# Patient Record
Sex: MALE | Race: WHITE | Employment: FULL TIME | URBAN - METROPOLITAN AREA
[De-identification: names, ages, dates, MRNs, and addresses within clinical notes are randomized per-mention and may not be internally consistent; named-entity substitution may affect disease eponyms.]

---

## 2021-03-11 ENCOUNTER — TELEPHONE (OUTPATIENT)
Dept: BEHAVIORAL HEALTH | Facility: CLINIC | Age: 56
End: 2021-03-11

## 2021-03-15 ENCOUNTER — TELEPHONE (OUTPATIENT)
Dept: BEHAVIORAL HEALTH | Facility: CLINIC | Age: 56
End: 2021-03-15

## 2021-03-15 ENCOUNTER — HOSPITAL ENCOUNTER (OUTPATIENT)
Dept: BEHAVIORAL HEALTH | Facility: CLINIC | Age: 56
Discharge: HOME | End: 2021-03-15
Attending: FAMILY MEDICINE | Admitting: FAMILY MEDICINE
Payer: COMMERCIAL

## 2021-03-15 VITALS — HEIGHT: 73 IN | WEIGHT: 210 LBS | BODY MASS INDEX: 27.83 KG/M2

## 2021-03-15 PROCEDURE — H0001 ALCOHOL AND/OR DRUG ASSESS: HCPCS | Mod: 95

## 2021-03-15 RX ORDER — LISINOPRIL 40 MG/1
TABLET ORAL
COMMUNITY
Start: 2020-06-15

## 2021-03-15 RX ORDER — CITALOPRAM HYDROBROMIDE 40 MG/1
40 TABLET ORAL DAILY
COMMUNITY
Start: 2021-02-24 | End: 2024-05-13

## 2021-03-15 RX ORDER — METOPROLOL SUCCINATE 25 MG/1
TABLET, EXTENDED RELEASE ORAL
COMMUNITY
Start: 2020-05-19

## 2021-03-15 RX ORDER — AMLODIPINE BESYLATE 10 MG/1
TABLET ORAL
COMMUNITY
Start: 2021-02-03

## 2021-03-15 RX ORDER — DEXTROAMPHETAMINE SACCHARATE, AMPHETAMINE ASPARTATE, DEXTROAMPHETAMINE SULFATE AND AMPHETAMINE SULFATE 5; 5; 5; 5 MG/1; MG/1; MG/1; MG/1
TABLET ORAL
COMMUNITY
Start: 2021-03-05

## 2021-03-15 ASSESSMENT — ANXIETY QUESTIONNAIRES
1. FEELING NERVOUS, ANXIOUS, OR ON EDGE: SEVERAL DAYS
6. BECOMING EASILY ANNOYED OR IRRITABLE: SEVERAL DAYS
4. TROUBLE RELAXING: MORE THAN HALF THE DAYS
5. BEING SO RESTLESS THAT IT IS HARD TO SIT STILL: MORE THAN HALF THE DAYS
7. FEELING AFRAID AS IF SOMETHING AWFUL MIGHT HAPPEN: SEVERAL DAYS
GAD7 TOTAL SCORE: 10
3. WORRYING TOO MUCH ABOUT DIFFERENT THINGS: SEVERAL DAYS
IF YOU CHECKED OFF ANY PROBLEMS ON THIS QUESTIONNAIRE, HOW DIFFICULT HAVE THESE PROBLEMS MADE IT FOR YOU TO DO YOUR WORK, TAKE CARE OF THINGS AT HOME, OR GET ALONG WITH OTHER PEOPLE: VERY DIFFICULT
2. NOT BEING ABLE TO STOP OR CONTROL WORRYING: MORE THAN HALF THE DAYS

## 2021-03-15 ASSESSMENT — COLUMBIA-SUICIDE SEVERITY RATING SCALE - C-SSRS
2. HAVE YOU ACTUALLY HAD ANY THOUGHTS OF KILLING YOURSELF LIFETIME?: NO
2. HAVE YOU ACTUALLY HAD ANY THOUGHTS OF KILLING YOURSELF?: NO
1. IN THE PAST MONTH, HAVE YOU WISHED YOU WERE DEAD OR WISHED YOU COULD GO TO SLEEP AND NOT WAKE UP?: NO
1. IN THE PAST MONTH, HAVE YOU WISHED YOU WERE DEAD OR WISHED YOU COULD GO TO SLEEP AND NOT WAKE UP?: NO

## 2021-03-15 ASSESSMENT — PATIENT HEALTH QUESTIONNAIRE - PHQ9: SUM OF ALL RESPONSES TO PHQ QUESTIONS 1-9: 12

## 2021-03-15 ASSESSMENT — MIFFLIN-ST. JEOR: SCORE: 1841.43

## 2021-03-15 ASSESSMENT — PAIN SCALES - GENERAL: PAINLEVEL: NO PAIN (0)

## 2021-03-15 NOTE — PROGRESS NOTES
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 relationships positive family connections, safe and stable environment, adherence with prescribed medication, living with other people, financial stability and pets    Risk Plan:  See Recommendations for Safety and Risk Management Plan    Review of Symptoms per patient report:  Substance Use:  daily use, substance use at school, substance related decrease in work performance, work absence due to substance use, family relationship problems due to substance use, driving under the influence and cravings/urges to use     Collateral Contact Summary:   Collateral contacts contributing to this assessment:  The patient's electronic medical records were reviewed at time of assessment.    If court related records were reviewed, summarize here: None    Information from collateral contacts supported/largely agreed with information from the client and associated risk ratings.    Information in this assessment was obtained from the medical record and provided by patient who is a good historian.        The patient will have open access to his substance use disorder assessment medical record.    Diagnostic Criteria:   1.)  Substance is often taken in  larger amounts or over a longer period than was intended.  Met for:  Alcohol, Cannabis and Opiates.  2.)  There is persistent desire or unsuccessful efforts to cut down or control use of the substance.  Met for:  Opiates.  3.)  A great deal of time is spent in activities necessary to obtain the substance, use the substance, or recover from its effects.  Met for:  Opiates.  4.)  Craving, or a strong desire or urge to use the substance.  Met for:  Alcohol, Cannabis and Opiates.  5.)  Recurrent use of the substance resulting in a failure to fulfill major role obligations at work, school, or home.  Met for:  Opiates.  6.)  Continued use of the substance despite having persistent or recurrent social or interpersonal problems caused or exacerbated by the effects of its use.  Met for:  Alcohol and Opiates.  8.)  Recurrent use of the substance in which it is physically hazardous.  Met for:  Cannabis and Opiates.  9.)  Use of the substance is continued despite knowledge of having a persistent or recurrent physical or psychological problem that is likely to have been cause or exacerbated by the substance.  Met for:  Cannabis and Opiates.  10.)  Tolerance:  either a need for markedly increased amounts of the substance to achieve the desired effect or a markedly diminished effect with continued use of the dame amount of the substance.  Met for:  Cannabis and Opiates.  11.)  Withdrawal:  either patient endorses characteristic withdrawal syndrome for the substance or the substance (or closely related substance) is taken to relieve or avoid withdrawal symptoms.  Met for:  Alcohol, Cannabis and Opiates.    As evidenced by self report and criteria, the patient meets the following DSM-5 Diagnoses: (Sustained by DSM-5 Criteria Listed Above)      1.)  Opioid Use Disorder Severe - 304.00 (F11.20)  2.)  Cannabis Use Disorder Severe - 304.30 (F12.20)  3.)  Alcohol Use Disorder Moderate - 303.90 (F10.20)  4.)  Depression NOS, per patient  self-report  5.)  ADHD, per patient self-report    Specify if: In early remission:  After full criteria for alcohol/drug use disorder were previously met, none of the criteria for alcohol/drug use disorder have been met for at least 3 months but for less than 12 months (with the exception that Criterion A4,  Craving or a strong desire or urge to use alcohol/drug  may be met).     In sustained remission:   After full criteria for alcohol use disorder were previously met, non of the criteria for alcohol/drug use disorder have been met at any time during a period of 12 months or longer (with the exception that Criterion A4,  Craving or strong desire or urge to use alcohol/drug  may be met).     Specify if:   This additional specifier is used if the individual is in an environment where access to alcohol is restricted.    Mild: Presence of 2-3 symptoms  Moderate: Presence of 4-5 symptoms  Severe: Presence of 6 or more symptoms    Recommendations:     1. Plan for Safety and Risk Management:     It was recommended the patient call 911 or go to the local Emergency Department should there be any significant change in the above risk factors.            Report to child / adult protection services was NA.    2. STACI Referrals:      Recommendations:      1.)  It was recommended the patient abstain from alcohol and from all other non-prescribed mood altering chemicals.   2.)  Follow all of the recommendations of his healthcare providers.  3.)  Enter the Mixed Primary Evening Outpatient program at [facility] in [place], [place] for primary substance use disorder treatment.  4.)  Follow all of the recommendations of his substance use disorder treatment providers.  5.)  Attend 12-step or other support group meetings on a weekly basis.      The patient reported he is willing to follow the above recommendations.    The patient would like the following family or other support people involved in their treatment:   None at this time.    The patient has a history of opiate use and was give treatment options, including Medication Assisted Treatment, and information on the risks of opioid use disorder including recognizing and responding to opioid overdose.    3.  Mental Health Referrals:     The patient would benefit from continuing to follow all of the recommendations of his mental health providers.    4. Cultural Concerns:    The patient did not identify having any cultural concerns regarding mental health, physical health, or substance use issues.     5. Recommendations for treatment focus:      Alcohol / Substance Use - See #2. STACI Referrals above for details on recommendations.  Depressed Mood - See #3. Mental Health Referrals above for details on recommendations.  Anxiety - See #3. Mental Health Referrals above for details on recommendations.  Attentional Problems - See #3. Mental Health Referrals above for details.    Provider Name/ Credentials:  JESUS Puga  March 15, 2021

## 2021-03-15 NOTE — TELEPHONE ENCOUNTER
This patient's Release of Information forms have been e-mailed to the patient via [employer] on 3/15/2021.  This counselor will await the return of this patient's signed Release of Information forms.

## 2021-03-16 ASSESSMENT — ANXIETY QUESTIONNAIRES: GAD7 TOTAL SCORE: 10

## 2021-03-16 NOTE — TELEPHONE ENCOUNTER
Date: 3/15/2021    Idris Woodson will be calling you to schedule a service initiation date for the Mixed Primary Evening Outpatient program at [facility] in [place], [place].  The best current contact telephone number for this patient is [phone].  The patient is aware you are on a waiting list until sometime in mid 4/2021, but he still wanted to be referred to [facility].  If the patient decides the wait is too long for treatment and he would like a different referral please have him contact me directly at [phone].     SBAR  Name:   Idris Woodson   YOB: 1965 Age:  55 year old Gender:  male   Referral Source: Self   Referral SUJATA: No   Insurance: The [facility] Department is responsible for obtaining ALL authorizations for treatment services at the EOP, DOP and LP levels of care.    Financial Screening: Financial approval from [employer]'s business office is NOT NEEDED.     Precipitating Event: Treatment due to own awareness of need for help and Treatment due to pressure from family members to get help     DOC: Opiates    Additional abused substances: Alcohol and Marijuana     Medical:   Past Medical History:   Diagnosis Date     ADHD      Arthritis      Depressive disorder      Hearing loss      Hypertension       Mental Health: Depression, ADHD, History of trauma and/or abuse issues and Significant grief and loss issues     Prior Detox admissions: No prior IP detoxification admission(s).    Prior CD treatments: No prior CD treatment(s).     Psychosocial history:       The patient reported having 1 adult son age 31 who lives on his own.  The patient reported having 1 daughter age 14 who lives in family home.     Stable housing and no concerns    Minimal support network, Marital or relationship conflict with significant other due to substance abuse, Relationship conflict with family members or friends due to substance abuse, No history of  "legal charges, Currently on a MLOA from work since 2/23/2021 and Stable finances     [place] Suicide Risk Status:  Past month: 0. - Very Low Risk:  Evaluation Counselors:  Document in Epic / BramasolAR to counselor \"Very Low Risk\".      Treatment Counselors:  Reassess upon admission as applicable, assess weekly in progress notes under Dimension 3 and summarize in Discharge / Treatment summary under Dimension 3.    Past 24 hours: 0. - Very Low Risk:  Evaluation Counselors:  Document in Epic / BramasolAR to counselor \"Very Low Risk\".      Treatment Counselors:  Reassess upon admission as applicable, assess weekly in progress notes under Dimension 3 and summarize in Discharge / Treatment summary under Dimension 3.     Additional Info as needed: There was no additional information to provide at this time.       "

## 2021-03-16 NOTE — TELEPHONE ENCOUNTER
This patient's STACI Assessment DAANES information was entered directly into the [place] Department [employer] DAANES website on 3/15/2021.  Assessment ID: [id number]

## 2021-03-16 NOTE — TELEPHONE ENCOUNTER
The patient's signed SUJATA's were e-mailed to [facility] on 3/15/2021 to be scanned into medical records.

## 2021-03-23 NOTE — TELEPHONE ENCOUNTER
----- Message from JESUS Alcaraz sent at 3/23/2021  1:22 PM CDT -----  Regarding: Set up 1:1 and IOP STACI appointments for client at [facility] location  Please set up a face to face 1:1 appointment with me for the above client on Monday, April 19 at 4:30PM at the [facility] location. My provider ID is; [id number].    Patient Name:  See above  Location of programming: [facility] [place]  Start Date: April 19, 2021  Group: ([id number] M, T, W, TH 5:30PM  Provider: JESUS Prasad  Number of visits to be scheduled: 24  Length/Duration of Appointment in minutes: 120 M, W,  T  Visit Type (VIDEO/TELEPHONE/IN-PERSON): In person   Additional notes:    Thanks    JESUS Prasad

## 2021-04-19 ENCOUNTER — HOSPITAL ENCOUNTER (OUTPATIENT)
Dept: BEHAVIORAL HEALTH | Facility: CLINIC | Age: 56
End: 2021-04-19
Attending: FAMILY MEDICINE
Payer: COMMERCIAL

## 2021-04-19 ENCOUNTER — HOSPITAL ENCOUNTER (OUTPATIENT)
Dept: BEHAVIORAL HEALTH | Facility: CLINIC | Age: 56
End: 2021-04-19
Attending: FAMILY MEDICINE | Admitting: FAMILY MEDICINE
Payer: COMMERCIAL

## 2021-04-19 DIAGNOSIS — F19.10 SUBSTANCE ABUSE (H): ICD-10-CM

## 2021-04-19 PROCEDURE — 80307 DRUG TEST PRSMV CHEM ANLYZR: CPT | Performed by: FAMILY MEDICINE

## 2021-04-19 PROCEDURE — 80349 CANNABINOIDS NATURAL: CPT | Performed by: FAMILY MEDICINE

## 2021-04-19 PROCEDURE — 80324 DRUG SCREEN AMPHETAMINES 1/2: CPT | Performed by: FAMILY MEDICINE

## 2021-04-19 PROCEDURE — 80361 OPIATES 1 OR MORE: CPT | Performed by: FAMILY MEDICINE

## 2021-04-19 PROCEDURE — 80346 BENZODIAZEPINES1-12: CPT | Performed by: FAMILY MEDICINE

## 2021-04-19 PROCEDURE — H2035 A/D TX PROGRAM, PER HOUR: HCPCS | Mod: HQ

## 2021-04-19 PROCEDURE — 80320 DRUG SCREEN QUANTALCOHOLS: CPT | Performed by: FAMILY MEDICINE

## 2021-04-19 PROCEDURE — 80359 METHYLENEDIOXYAMPHETAMINES: CPT | Performed by: FAMILY MEDICINE

## 2021-04-19 PROCEDURE — H2035 A/D TX PROGRAM, PER HOUR: HCPCS

## 2021-04-20 ENCOUNTER — HOSPITAL ENCOUNTER (OUTPATIENT)
Dept: BEHAVIORAL HEALTH | Facility: CLINIC | Age: 56
End: 2021-04-20
Attending: FAMILY MEDICINE
Payer: COMMERCIAL

## 2021-04-20 LAB
AMPHETAMINES UR QL SCN: POSITIVE
BARBITURATES UR QL: NEGATIVE
BENZODIAZ UR QL: NEGATIVE
CANNABINOIDS UR QL SCN: POSITIVE
COCAINE UR QL: NEGATIVE
CREAT UR-MCNC: 49 MG/DL
ETHANOL UR QL SCN: NEGATIVE
OPIATES UR QL SCN: NEGATIVE
PCP UR QL SCN: NEGATIVE

## 2021-04-20 PROCEDURE — H2035 A/D TX PROGRAM, PER HOUR: HCPCS | Mod: HQ,95 | Performed by: SOCIAL WORKER

## 2021-04-20 NOTE — ADDENDUM NOTE
[facility] on: 4/20/2021 4:37 PM   Actions taken: Clinical Note Signed

## 2021-04-20 NOTE — GROUP NOTE
"Group Therapy Documentation    PATIENT'S NAME: Idris Woodson  MRN:   [id number]  :   1965  ACCT. NUMBER: [id number]  DATE OF SERVICE: 21  START TIME:  5:30 PM  END TIME:  7:30 PM  FACILITATOR(S): Ricardo Britton LADC  TOPIC: BEH Group Therapy  Number of patients attending the group:  8    Group Length:  2 Hours    Group Therapy Type: Life skill(s)    Summary of Group / Topics Discussed:    Balanced Lifestyle , Coping Skills/Lifestyle Managemet, Leisure Exploration/Use of Leisure Time, Self-Care Activities, and Non 12 step peer lead sober support groups. Recovery Program (recovery resilience) skills utilized in the past week. Actions taken to avoid and/or deal with trigger and craving situations leading to relapse.     Group Attendance:  Attended group session    Patient's response to the group topic/interactions:  confronted peers appropriately and cooperative with task    Patient appeared to be Actively participating and Engaged.        Client specific details:  Client first night in [facility] STACI treatment. Client has no experience with previous treatment episodes or sober support groups. Client did express concern about giving up \"all chemicals\" including THC as he believes his primary problem is with Opioids and alcohol. Dimension 4, 5..  "

## 2021-04-20 NOTE — PROGRESS NOTES
Client:  Idris Woodson  MRN: [id number]    Comprehensive Assessment UPDATE/[facility]/[place] Re-Assess   Comprehensive Assessment Update: 4/19/2021    Comprehensive assessment dated 3/15 was reviewed and updates are as follows: ROBERT Dates   Have changed as follows:     Alcohol: from 3/8 to 4/16  THC: From 2/15 to 4/18    Reason for admission today:  Recommendation for client to start an IOP STACI program.     Dates of last use and substance(s) used:  See above    Patient has a history of opiate use and was give treatment options, including Medication Assisted Treatment, and information on the risks of opiod use disorder including recognizing and responding to opiod overdose.    Safety concerns:  This counselor noted that client should obtain several doses of Narcan from his pharmacist  And train the people he lives with to use it on him in the event he experiences an overdose of opioids in their   presence.       Other:  Client and counselor discussed specifics and expectations regarding this abstinence based treatment   program, including use of prescription amphetamines for ADHD diagnosis. We discussed     Health Screening:  Given patient's past history, a medication, and physical condition, is there a fall risk?  No  Does the patient have any pain? No  Is the patient on a special diet? If yes, please explain: no  Does the patient have any concerns regarding your nutritional status? If yes, please explain: no  Has the patient had any appetite changes in the last 3 months?  No  Has the patient had any weight loss or weight gain in the last 3 months? No  Has the patient have a history of an eating disorder or been over-eating, avoiding meals, or inducing vomiting?  No  Does the patient have any dental concerns? (Problems with teeth, pain, cavities, braces)?  NO  Are immunizations up to date?  Yes  Any recent exposure to TB, Hepatitis, Measles, or Strep?  No  Client's BMI is .  Client informed of BMI?       Dimension Scale Ratings:    Dimension 1: 1 Client can tolerate and cope with withdrawal discomfort. The client displays mild to moderate intoxication or signs and symptoms interfering with daily functioning but does not immediately endanger self or others. Client poses minimal risk of severe withdrawal.    Dimension 2: 2 Client has difficulty tolerating and coping with physical problems or has other biomedical problems that interfere with recovery and treatment. Client neglects or does not seek care for serious biomedical problems.  Past Medical History:   Diagnosis Date     ADHD      Arthritis      Depressive disorder      Hearing loss      Hypertension      Current Outpatient Medications   Medication     amLODIPine (NORVASC) 10 MG tablet     amphetamine-dextroamphetamine (ADDERALL) 20 MG tablet     citalopram (CELEXA) 40 MG tablet     lisinopril (ZESTRIL) 40 MG tablet     metoprolol succinate ER (TOPROL-XL) 25 MG 24 hr tablet     No current facility-administered medications for this encounter.      Dimension 3: 2 Client has difficulty with impulse control and lacks coping skills. Client has thoughts of suicide or harm to others without means; however, the thoughts may interfere with participation in some treatment activities. Client has difficulty functioning in significant life areas. Client has moderate symptoms of emotional, behavioral, or cognitive problems. Client is able to participate in most treatment activities.    Dimension 4: 2 Client displays verbal compliance, but lacks consistent behaviors; has low motivation for change; and is passively involved in treatment.    Dimension 5: 3 Client has poor recognition and understanding of relapse and recidivism issues and displays moderately high vulnerability for further substance use or mental health problems. Client has few coping skills and rarely applies coping skills.    Dimension 6: 3 Client is not engaged in structured, meaningful activity and the  "client's peers, family, significant other, and living environment are unsupportive, or there is significant criminal justice system involvement.    Initial Service Plan (ISP)    Immediate health, safety, and preliminary service needs identified and plan includes the following based on available information from clients, referral sources, and collateral information.    Safety (SI, SIB, suicide attempts, aggressive behaviors):  None  Recommended that patient call 911 or go to the local ED should there be a change in any of these risk factors.     Health:  Client does NOT have health issues that would impede participation in treatment    Transportation: Client will be transported to treatment by self.     Other:  Client and counselor discussed that he will be attending S IOP group in person, he will be doing a UA   In the clinic at least each week and he will commit to abstaining from non-prescribed chemicals while in attendance. This will involved signing an SUJATA for his Adderall prescriber noting he is in an STACI treatment program and he is committing to using the prescription as written.     Client and counselor discussed that he will also set up weekly 1:1 therapist appointments as he has been attending in the past ASAP and will sign SUJATA for that provider as well to allow for exchange of clinical information for coordination of care. Counselor explained the parameters of this abstinence based STACI program and cautioned client to not underestimate the effort it will take to begin and maintain abstinence and avoid relapse to \"his drugs of use\".     Patient does not have any identified barriers to participating in referred services.    Vulnerable Adult Assessment    Does the patient possess a physical or mental infirmity or other physical, mental, or emotional dysfunction?  No. Patient is not a vulnerable adult.    Treatment suggestions for client for the time period until the    initial treatment planning session:  In " "addition to above commitment to set up 1:1 therapy sessions for self, client will  consider (and hopefully commit) to set up appointment with Addiction Medication Specialist MD for Suboxone to address  cravings and reduced effect in the event of relapse to opioids and/or alcohol. Client admitted since \"weaning himself away\" from opioids, his use of alcohol has increased in the last several weeks.     [place] Re-Assessment:     Have you ever wished you were dead or that you could go to sleep and not wake up? Lifetime?  No   Past Month?  No     Have you actually had any thoughts of killing yourself?  Lifetime?  No   Past Month?  No     Have you been thinking about how you might do this? Lifetime?  No   Past Month?  No     Have you had these thoughts and had some intention of acting on them?  Lifetime?  N/A   Past Month?  N/A     Have you started to work out the details of how to kill yourself?  Lifetime?  N/A   Past Month?  N/A     Do you intend to carry out this plan?   N/A     When you have the thoughts how long do they last?   N/A    Are there things - anyone or anything (ie Family, Sabianism, pain of death) that stopped you from wanting to die or acting on thoughts of suicide?   Does not apply        2008  [employer]  Used with permission by Alvina Platt, PhD.      Ricardo Britton [facility]       4/19/2021     7:48 PM    "

## 2021-04-21 ENCOUNTER — BEH TREATMENT PLAN (OUTPATIENT)
Dept: BEHAVIORAL HEALTH | Facility: CLINIC | Age: 56
End: 2021-04-21

## 2021-04-21 ENCOUNTER — HOSPITAL ENCOUNTER (OUTPATIENT)
Dept: BEHAVIORAL HEALTH | Facility: CLINIC | Age: 56
End: 2021-04-21
Attending: FAMILY MEDICINE
Payer: COMMERCIAL

## 2021-04-21 DIAGNOSIS — F19.10 SUBSTANCE ABUSE (H): ICD-10-CM

## 2021-04-21 PROCEDURE — H2035 A/D TX PROGRAM, PER HOUR: HCPCS | Mod: HQ
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
## 2021-04-21 NOTE — TREATMENT PLAN
Patient Safety Plan Template    Name:   Idris Woodson YOB: 1965 Age:  56 year old MR Number:  [id number]   Step 1: Warning signs (Thoughts, images, mood, situation, behavior) that a crisis may be developin.      2.     3.      Step 2: Internal coping strategies - Things I can do to take my mind off of my problems without contacting another person (relaxation technique, physical activity):     1.     2.      3.      Step 3: People and social settings that provide distraction:     1. Name:   Phone:    2. Name:    Phone:   3. Place:    4. Place:      The one thing that is most important to me and worth living for is:      Step 4: People whom I can ask for help:     1. Name:    Phone:     2. Name:    Phone:      3. Name:    Phone:      Step 5: Professionals or agencies I can contact during a crisis:     1. Clinician Name: NA:[id number]   Phone:   Clinician Pager or Emergency Contact #:      2. Clinician Name:  Phone:      Clinician Pager or Emergency Contact #:      3. Local Urgent Care Services:    Urgent Care Services Address:     Urgent Care Services Phone:      4. Suicide Prevention Lifeline Phone: 7-861-516-KYPP (7397)     Step 6: Making the environment safe:     1.      2.      Safety Plan Template [address] and Dalton Ramirez is reprinted with the express permission of the authors.  No portion of the Safety Plan Template may be reproduced without the express, written permission.  You can contact the authors at [email or web address] or [email or web address].

## 2021-04-22 ENCOUNTER — HOSPITAL ENCOUNTER (OUTPATIENT)
Dept: BEHAVIORAL HEALTH | Facility: CLINIC | Age: 56
End: 2021-04-22
Attending: FAMILY MEDICINE
Payer: COMMERCIAL

## 2021-04-22 PROCEDURE — H2035 A/D TX PROGRAM, PER HOUR: HCPCS | Mod: HQ
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
## 2021-04-22 NOTE — ADDENDUM NOTE
[facility] on: 4/22/2021 4:44 PM   Actions taken: Clinical Note Signed

## 2021-04-23 LAB
CANNABINOIDS UR CFM-MCNC: 91 NG/ML
CARBOXYTHC/CREAT UR: 186 NG/MG{CREAT}

## 2021-04-26 ENCOUNTER — HOSPITAL ENCOUNTER (OUTPATIENT)
Dept: BEHAVIORAL HEALTH | Facility: CLINIC | Age: 56
End: 2021-04-26
Attending: FAMILY MEDICINE
Payer: COMMERCIAL

## 2021-04-26 DIAGNOSIS — F19.10 SUBSTANCE ABUSE (H): Primary | ICD-10-CM

## 2021-04-26 PROCEDURE — 80307 DRUG TEST PRSMV CHEM ANLYZR: CPT | Performed by: FAMILY MEDICINE

## 2021-04-26 PROCEDURE — H2035 A/D TX PROGRAM, PER HOUR: HCPCS | Mod: HQ

## 2021-04-26 PROCEDURE — 80320 DRUG SCREEN QUANTALCOHOLS: CPT | Performed by: FAMILY MEDICINE

## 2021-04-26 PROCEDURE — 80349 CANNABINOIDS NATURAL: CPT | Performed by: FAMILY MEDICINE

## 2021-04-26 PROCEDURE — 80324 DRUG SCREEN AMPHETAMINES 1/2: CPT | Performed by: FAMILY MEDICINE

## 2021-04-26 PROCEDURE — 80359 METHYLENEDIOXYAMPHETAMINES: CPT | Performed by: FAMILY MEDICINE

## 2021-04-27 ENCOUNTER — HOSPITAL ENCOUNTER (OUTPATIENT)
Dept: BEHAVIORAL HEALTH | Facility: CLINIC | Age: 56
End: 2021-04-27
Attending: FAMILY MEDICINE
Payer: COMMERCIAL

## 2021-04-27 DIAGNOSIS — F19.10 SUBSTANCE ABUSE (H): Primary | ICD-10-CM

## 2021-04-27 LAB
6MAM SERPL-MCNC: NEGATIVE NG/ML
A-OH ALPRAZ UR CFM-MCNC: NEGATIVE NG/ML
ALPRAZ UR CFM-MCNC: NEGATIVE NG/ML
AMPHET UR CFM-MCNC: 7842 NG/ML
AMPHET UR QL CFM: NORMAL
AMPHETAMINES UR QL SCN: POSITIVE
BARBITURATES UR QL: NEGATIVE
BENZODIAZ UR QL: NEGATIVE
CANNABINOIDS UR QL SCN: POSITIVE
COCAINE UR QL: NEGATIVE
CODEINE UR CFM-MCNC: NEGATIVE NG/ML
CREAT UR-MCNC: 106 MG/DL
ETHANOL UR QL SCN: NEGATIVE
ETHYL GLUCURONIDE UR QL: NEGATIVE
LORAZEPAM UR CFM-MCNC: NEGATIVE NG/ML
MORPHINE UR CFM-MCNC: NEGATIVE NG/ML
NORDIAZEPAM UR CFM-MCNC: NEGATIVE NG/ML
OPIATES UR QL SCN: NEGATIVE
OXAZEPAM UR CFM-MCNC: NEGATIVE NG/ML
PCP UR QL SCN: NEGATIVE
TEMAZEPAM UR CFM-MCNC: NEGATIVE NG/ML

## 2021-04-27 PROCEDURE — H2035 A/D TX PROGRAM, PER HOUR: HCPCS | Mod: HQ,GT | Performed by: SOCIAL WORKER

## 2021-04-27 NOTE — GROUP NOTE
"Group Therapy Documentation    PATIENT'S NAME: Idris Woodson  MRN:   [id number]  :   1965  ACCT. NUMBER: [id number]  DATE OF SERVICE: 21  START TIME:  5:30 PM  END TIME:  7:30 PM  FACILITATOR(S): Ricardo Britton LADC  TOPIC: BEH Group Therapy  Number of patients attending the group:  6    Group Length:  2 Hours    Group Therapy Type: Addiction, Life skill(s), and Skills/Education    Summary of Group / Topics Discussed:    Coping Skills/Lifestyle Managemet, Choices in Recovery, Leisure Exploration/Use of Leisure Time, and Post Acute Withdrawal Syndrome/overview and discussion regarding symptom management and brain healing techniques.       Group Attendance:  Attended group session    Patient's response to the group topic/interactions:  confronted peers appropriately and cooperative with task    Patient appeared to be Engaged.        Client specific details:  Client asked questions about different PAWS effects for different drugs of abuse. I responded that I was not aware of data to that effect (I.e. \"more using dreams\" for amphetamines vs. Alcohol). Lead to group discussion regarding intensity and frequency of   Using dreams in general getting less frequent and less intense over time. Dimension 3, 5.   "

## 2021-04-28 ENCOUNTER — HOSPITAL ENCOUNTER (OUTPATIENT)
Dept: BEHAVIORAL HEALTH | Facility: CLINIC | Age: 56
End: 2021-04-28
Attending: FAMILY MEDICINE
Payer: COMMERCIAL

## 2021-04-28 DIAGNOSIS — F19.10 SUBSTANCE ABUSE (H): Primary | ICD-10-CM

## 2021-04-28 PROCEDURE — H2035 A/D TX PROGRAM, PER HOUR: HCPCS | Mod: HQ

## 2021-04-28 NOTE — GROUP NOTE
Group Therapy Documentation    PATIENT'S NAME: Idris Woodson  MRN:   [id number]  :   1965  ACCT. NUMBER: [id number]  DATE OF SERVICE: 21  START TIME:  5:30 PM  END TIME:  8:30 PM  FACILITATOR(S): Alia Cade LICSW  TOPIC: BEH Group Therapy  Number of patients attending the group:  10  Group Length:  3 Hours    Group Therapy Type: Addiction    Summary of Group / Topics Discussed:    Psychoeducation/Skills Self-Care and Humor in Recovery (IOP)  Learn how powerful self-care is in healing from addiction and building resiliency in mental and physical health.  Learn many aspects of self-care linked to elevating moods, increasing energy reserve and staminal, thinking clearer, focusing better on tasks and improving organizational skills.     Objective(s):    Name 3 reasons self-care is crucial for optimal health and recovery from illness.      Identify 2 mental and physical illnesses directly linked to poor self-care.    Identify 3 ways social connections build resilience and may strengthen our immune systems.    Create a personal plan of action to add to daily schedule of structure and routine.     Structure (modalities, homework, worksheets, etc.):    Self-Care quiz (worksheet)    Education on Self-Care with emphasis on Recovery (PowerPoint)    Why Self-care is a necessity, not a luxury (discussion/white board)    Worksheet to explore and prioritize needs    Action plan to start this week    Expected therapeutic outcome(s):     Be more proactive in their mental and physical health     Start their personal self-care plan this week and note results in journal    Take an inventory of choices and behaviors that hinder their health and have a negative effect on the quality of their lives.      Work to remove the obstacles that keep them from these aspects of self-care.     Therapeutic outcome(s) measured by:   Teachback, creation of personal action plan, and group review and evaluation  Telemedicine  "Visit: The patient's condition can be safely assessed and treated via synchronous audio and visual telemedicine encounter.      Reason for Telemedicine Visit: Services only offered telehealth     Originating Site (Patient Location): Patient's home     Distant Site (Provider Location): Provider Remote Setting     Consent:  The patient/guardian has verbally consented to: the potential risks and benefits of telemedicine (video visit) versus in person care; bill my insurance or make self-payment for services provided; and responsibility for payment of non-covered services.      Mode of Communication:  Video Conference via [email or web address]     As the provider I attest to compliance with applicable laws and regulations related to telemedicine.        Group Attendance:  Attended group session    Patient's response to the group topic/interactions:  discussed personal experience with topic, expressed readiness to alter behaviors and expressed understanding of topic    Patient appeared to be Actively participating.        Client specific details:  Js shared that he is doing well and he's excited that his first week of the program is finished as he was nervous about starting this treatment. We spoke of the courage it takes to join this treatment group and share about one's life. He is having some sleep issues and will try a mindfulness marcos tonight and said his stress is at 6-7 mainly due to being on short-term disability and wanting to stay productive as possible. On Saturday he got a lot accomplished and also made dinner for his family and felt very good about the day. He had mild cravings with regard to cannabis but \"none for the opiates.\" On the worksheet he chose the category of sleep hygiene and this week will take the action step of \"tying to cut down on caffeine and sugar, try the sleep marcos and stop my screen time at lease an hour before bed.\" This topic completes one of the six MH Skills Groups required under D3 of his " Treatment Plan.

## 2021-04-29 ENCOUNTER — HOSPITAL ENCOUNTER (OUTPATIENT)
Dept: BEHAVIORAL HEALTH | Facility: CLINIC | Age: 56
End: 2021-04-29
Attending: FAMILY MEDICINE
Payer: COMMERCIAL

## 2021-04-29 DIAGNOSIS — F19.10 SUBSTANCE ABUSE (H): Primary | ICD-10-CM

## 2021-04-29 LAB
CANNABINOIDS UR CFM-MCNC: 22 NG/ML
CARBOXYTHC/CREAT UR: 21 NG/MG{CREAT}

## 2021-04-29 PROCEDURE — H2035 A/D TX PROGRAM, PER HOUR: HCPCS | Mod: HQ

## 2021-04-29 NOTE — GROUP NOTE
"Group Therapy Documentation    PATIENT'S NAME: Idris Woodson  MRN:   [id number]  :   1965  ACCT. NUMBER: [id number]  DATE OF SERVICE: 21  START TIME:  5:30 PM  END TIME:  7:30 PM  FACILITATOR(S): Ricardo Britton LADC  TOPIC: BEH Group Therapy  Number of patients attending the group:  7    Group Length:  2 Hours    Group Therapy Type: Addiction and \"New Perspectives on Addiction and Recovery\" Brain Chemistry, updates on phenomenon   Of how addiction works in the brain and the genetics of addiction and recovery.     Summary of Group / Topics Discussed:    Personal experiences and feedback regarding symptoms of active addiction, use of techniques to \"heal\" brain from active addiction and understanding of how addiction works in the brain in terms of mood altering chemicals and memory/craving phenomenon.           Group Attendance:  Attended group session    Patient's response to the group topic/interactions:  confronted peers appropriately and cooperative with task    Patient appeared to be Attentive and Engaged.        Client specific details:  Client expressed he wanted to know more about the genetic component of addiction and wasn't sure about genetic component of recovery. Lead to group discussion regarding cutting edge research in \"epigenetics\" in terms of trauma and other physical and emotional \"traits\" passed down through generations. Dimension 3, 4.   "

## 2021-04-30 NOTE — PROGRESS NOTES
[facility] Weekly Treatment Plan Review      ATTENDANCE for the following date span:   -     Date     Group Therapy 2.0 hours 3 hours 2.0 hours 2.0 hours  hours   Individual Therapy        Family Therapy        Other (Specify)            Patient did not have any absences during this time period (list absence dates and reason for absence).        Weekly Treatment Plan Review     Treatment Plan initiated on: 21.    Dimension1: Acute Intoxication/Withdrawal Potential -   Previous Dimension Ratin  Current Dimension Ratin  Date of Last Use Alcohol: from 3/8 to   THC: From 2/15 to   Any reports of withdrawal symptoms - No    Dimension 2: Biomedical Conditions & Complications -   Previous Dimension Ratin  Current Dimension Ratin  Medical Concerns:  See below.   Current Medications & Medication Changes:    Past Medical History:   Diagnosis Date     ADHD      Arthritis      Depressive disorder      Hearing loss      Hypertension      Current Outpatient Medications   Medication     amLODIPine (NORVASC) 10 MG tablet     amphetamine-dextroamphetamine (ADDERALL) 20 MG tablet     citalopram (CELEXA) 40 MG tablet     lisinopril (ZESTRIL) 40 MG tablet     metoprolol succinate ER (TOPROL-XL) 25 MG 24 hr tablet     No current facility-administered medications for this encounter.      Medication Prescriber:  See EMR meds prescribed by Nima Posey MD    [address] [place] [address]    [phone] (Fax)      Taking meds as prescribed? Yes  Medication side effects or concerns: This counselor has attempted to determine therapeutic level of amphetamines  In UA test.   Outside medical appointments this week (list provider and reason for visit):  Would be with [facility].     Dimension 3: Emotional/Behavioral Conditions & Complications -   Previous Dimension Ratin  Current Dimension  Ratin  PHQ9     PHQ-9 SCORE 3/15/2021   PHQ-9 Total Score 12     GAD7     AMNA-7 SCORE 3/15/2021   Total Score 10     Mental health diagnosis: History of trauma issues due his parents getting  when he was 10 years old, due to his brother's death from an alcohol and drug overdose in  and due to be bullied for his hearing loss by peers at school when he was growing up.  The patient reported having significant grief and loss issues regarding the deaths of his grandparents and his brother.   Date of last SIB:  None  Date of  last SI:  None  Date of last HI: None    Behavioral Targets:  Improve self-esteem. and Understand the relationship between addiction and mental health issues. Establish and maintain regular appointments with therapist to deal with above issues Learn how to apply self-care and psychotherapy to build a repertoire of daily habits that counteract PAWS, fatigue, depression and anxiety leading to increased resiliency and well-being.     Current MH Assignments:  List 5 ways your addiction has impacted your mental health.  and put actions into place as directed by therapist to deal with above reported issues. Report weekly on acctions taken. Attend each of the following groups, completing one topic per week, to  complete all 6 by due date, unless excused by primary counselor.  All group work to be completed each session to receive an effective outcome.     Narrative:  Client expressing willingness and effort to reestablish BEH therapy 1:1 appts. With outside   Provider.    Dimension 4: Treatment Acceptance / Resistance -   Previous Dimension Ratin  Current Dimension Ratin  STACI Diagnosis:  Alcohol Use Disorder   303.90 (F10.20) Moderate In a controlled environment  304.30 (F12.20) Cannabis Use Disorder Severe  In a controlled environment  Opioid Use Disorder, Specify if:  In a controlled environment with a severity of:  304.00 (F11.20) Severe  Stage - 4  Commitment to tx  process/Stage of change- Precontemplation  STACI assignments - Take action to build recovery resilience on weekly basis. Present using history, consequences of use and 5 values violated. and move up stages of change for sobriety. Increase internal motivation for sobeity.report weekly on recovery program skills put in place to develop recovery resilience.     Narrative - Client reports ambivalence regarding commitment to abstinence from THC.     Dimension 5: Relapse / Continued Problem Potential -   Previous Dimension Rating:  3  Current Dimension Rating:  3  Relapses this week - YES, List See below for UA lab this week.   Urges to use - YES, List THC and alcohol cravings.   UA results -   Recent Results (from the past 168 hour(s))   Drug abuse screen 8 urine (UR)    Collection Time: 04/26/21  7:00 PM   Result Value Ref Range    Amphetamine Qual Urine Positive (A) NEG^Negative    Barbiturates Qual Urine Negative NEG^Negative    Benzodiazepine Qual Urine Negative NEG^Negative    Cannabinoids Qual Urine Positive (A) NEG^Negative    Cocaine Qual Urine Negative NEG^Negative    Ethanol Qual Urine Negative NEG^Negative    Opiates Qualitative Urine Negative NEG^Negative    PCP Qual Urine Negative NEG^Negative   THC Confirmation Quantitative Urine    Collection Time: 04/26/21  7:00 PM   Result Value Ref Range    THC Metabolite 22 ng/mL    THC/Creatinine Ratio 21 ng/mg[creat]   Creatinine random urine    Collection Time: 04/26/21  7:00 PM   Result Value Ref Range    Creatinine Urine Random 106 mg/dL       Narrative- Client admitted recent use of Alcohol and THC prior to first appt.   This week. Agreement is he will be doing UA's weekly.     Dimension 6: Recovery Environment -   Previous Dimension Rating:  3  Current Dimension Rating:  3  Family Involvement - None  Summarize attendance at family groups and family sessions - None  Family supportive of treatment?  Yes    Community support group attendance - None  Recreational  activities - Outdoor activities with minor daughter.     Narrative - Client first week in [facility] STACI treatment.     Justification for Continued Treatment at this Level of Care:  First week in [facility] STACI treatment.     Discharge Planning:  Target Discharge Date/Timeframe:  End of September   Med Mgmt Provider/Appt:  See EMR   Ind therapy Provider/Appt:  See EMR for future SUJATA for provider   Family therapy Provider/Appt:  None   Other referrals:  [facility]    Has vulnerable adult status change? No    Service Coordination:  None    Supervision:  None    Are Treatment Plan goals/objectives effective? No,  *If no, list changes to treatment plan:    Are the current goals meeting client's needs? No,  *If no, list the changes to treatment plan.    Client Input / Response: Client is expressing ambivalence about abstinence    *Client agrees with any changes to the treatment plan: Yes  *Client received copy of changes: No  *Client is aware of right to access a treatment plan review: Yes

## 2021-04-30 NOTE — ADDENDUM NOTE
Encounter addended by: Ricardo Britton [facility] on: 4/30/2021 2:21 PM   Actions taken: Clinical Note Signed

## 2021-04-30 NOTE — GROUP NOTE
"Group Therapy Documentation    PATIENT'S NAME: Idris Woodson  MRN:   [id number]  :   1965  ACCT. NUMBER: [id number]  DATE OF SERVICE: 21  START TIME:  5:30 PM  END TIME:  7:30 PM  FACILITATOR(S): Ricardo Britton LADC  TOPIC: BEH Group Therapy  Number of patients attending the group:  6    Group Length:  2 Hours    Group Therapy Type: Psychotherapeutic  Continued discussion about brain healing  and how craving and memory (euphoric) work together to reinforce relapse. Group lead  discussion regarding personal stress situations and proposed techniques to handle it   without resorting to addictive chemicals.      Summary of Group / Topics Discussed:    Psychoeducation/Skills Stress Management and Addiction    Objective(s):    Identify 2 ways stress is connected to relapse in addiction    Identify 3 ways stress affects physical and mental health    Identify 3 ways to manage stress    Identify 1 healthy daily routine to commit to starting this week.     Structure (modalities, homework, worksheets, etc.):    Stress Management Handout   o Definition of Stress  o Importance of self-awareness to warning signs of stress  o Stress reduction in recovery  o Skills used to reduce stress and counteract its impact    Demonstration and practice of deep breathing exercise     Demonstration and practice of Mindfulness     Demonstration and practice of Progressive Relaxation exercise     Demonstration of Aromatherapy inhalers    Expected therapeutic outcome(s):     Decreased frequency and intensity of PAWS    Increased stamina and resiliency    Improved ability to function at optimal level     Therapeutic outcome(s) measured by:   Teachback and group review and evaluation          Group Attendance:  Attended group session    Patient's response to the group topic/interactions:  cooperative with task    Patient appeared to be Attentive and Engaged.        Client specific details:  Client shared his reluctance to \"buy " "into\" the concept of using one mood altering chemical to replace another \"more dangerous\" drug as pathway for relapse. This is his first treatment episode as an adult. Lead to group discussion with peers who did that as adults and eventually returned to their primary drug of abuse. Counselor did agree that immediate \"lethality\" for heroin for example is greater than THC, but that is not the issue. Dimension 3, 4, 5.  "

## 2021-05-01 NOTE — ADDENDUM NOTE
Encounter addended by: Ricardo Britton LADC on: 5/1/2021 10:34 AM   Actions taken: Clinical Note Signed

## 2021-05-03 LAB
AMPHET UR CFM-MCNC: NORMAL NG/ML
AMPHET UR QL CFM: NORMAL
ETHYL GLUCURONIDE UR QL: NEGATIVE

## 2021-05-05 ENCOUNTER — HOSPITAL ENCOUNTER (OUTPATIENT)
Dept: BEHAVIORAL HEALTH | Facility: CLINIC | Age: 56
End: 2021-05-05
Attending: FAMILY MEDICINE
Payer: COMMERCIAL

## 2021-05-05 DIAGNOSIS — F19.10 SUBSTANCE ABUSE (H): Primary | ICD-10-CM

## 2021-05-05 PROCEDURE — H2035 A/D TX PROGRAM, PER HOUR: HCPCS | Mod: HQ

## 2021-05-06 ENCOUNTER — HOSPITAL ENCOUNTER (OUTPATIENT)
Dept: BEHAVIORAL HEALTH | Facility: CLINIC | Age: 56
End: 2021-05-06
Attending: FAMILY MEDICINE
Payer: COMMERCIAL

## 2021-05-06 DIAGNOSIS — F19.10 SUBSTANCE ABUSE (H): Primary | ICD-10-CM

## 2021-05-06 PROCEDURE — H2035 A/D TX PROGRAM, PER HOUR: HCPCS | Mod: HQ

## 2021-05-06 NOTE — GROUP NOTE
Group Therapy Documentation    PATIENT'S NAME: Idris Woodson  MRN:   [id number]  :   1965  ACCT. NUMBER: [id number]  DATE OF SERVICE: 21  START TIME:  5:30 PM  END TIME:  7:30 PM  FACILITATOR(S): Ricardo Britton LADC  TOPIC: BEH Group Therapy  Number of patients attending the group:  9    Group Length:  2 Hours    Group Therapy Type: Addiction and Psychoeducation    Summary of Group / Topics Discussed:    Grief & Loss and stages of change for sobriety and connections between stages of grief and stages of change.           Group Attendance:  Attended group session    Patient's response to the group topic/interactions:  discussed personal experience with topic    Patient appeared to be Engaged.        Client specific details:  Client shared his understanding of grief and loss of using THC regularly vs. His motivation for recovery. Agreed with peer that while he still has cravings to use THC, the longer he goes without it, the more his motivation to practice recovery program skills for resilience against relapse. Dimension 4, 5..

## 2021-05-07 NOTE — PROGRESS NOTES
[facility] Weekly Treatment Plan Review      ATTENDANCE for the following date span:  5/3 -     Date Monday 5/3 Tuesday 5/4 Wednesday 5/5 Thursday 5/6 Friday    Group Therapy  hours  hours 2.0 hours 2.0 hours  hours   Individual Therapy        Family Therapy        Other (Specify)            Patient did have any absences during this time period (list absence dates and reason for absence).  5/3 was late in returning from out of town trip, called counselor.  was not feeling well and called counselor.       Weekly Treatment Plan Review     Treatment Plan initiated on: 21.    Dimension1: Acute Intoxication/Withdrawal Potential -   Previous Dimension Ratin  Current Dimension Ratin  Date of Last Use Alcohol: from 3/8 to   THC: From 2/15 to   Any reports of withdrawal symptoms - No    Dimension 2: Biomedical Conditions & Complications -   Previous Dimension Ratin  Current Dimension Ratin  Medical Concerns:  See below.   Current Medications & Medication Changes:    Past Medical History:   Diagnosis Date     ADHD      Arthritis      Depressive disorder      Hearing loss      Hypertension      Current Outpatient Medications   Medication     amLODIPine (NORVASC) 10 MG tablet     amphetamine-dextroamphetamine (ADDERALL) 20 MG tablet     citalopram (CELEXA) 40 MG tablet     lisinopril (ZESTRIL) 40 MG tablet     metoprolol succinate ER (TOPROL-XL) 25 MG 24 hr tablet     No current facility-administered medications for this encounter.      Medication Prescriber:  See EMR meds prescribed by Nima Posey MD    [address] [place] [address]    [phone] (Fax)      Taking meds as prescribed? Yes  Medication side effects or concerns: This counselor has attempted to determine therapeutic level of amphetamines  In UA test.   Outside medical appointments this week (list provider and reason for visit):  [facility]     Dimension 3:  Emotional/Behavioral Conditions & Complications -   Previous Dimension Ratin  Current Dimension Ratin  PHQ9     PHQ-9 SCORE 3/15/2021   PHQ-9 Total Score 12     GAD7     AMNA-7 SCORE 3/15/2021   Total Score 10     Mental health diagnosis: History of trauma issues due his parents getting  when he was 10 years old, due to his brother's death from an alcohol and drug overdose in  and due to be bullied for his hearing loss by peers at school when he was growing up.  The patient reported having significant grief and loss issues regarding the deaths of his grandparents and his brother.   Date of last SIB:  None  Date of  last SI:  None  Date of last HI: None    Behavioral Targets:  Improve self-esteem. and Understand the relationship between addiction and mental health issues. Establish and maintain regular appointments with therapist to deal with above issues Learn how to apply self-care and psychotherapy to build a repertoire of daily habits that counteract PAWS, fatigue, depression and anxiety leading to increased resiliency and well-being.     Current MH Assignments:  List 5 ways your addiction has impacted your mental health.  and put actions into place as directed by therapist to deal with above reported issues. Report weekly on acctions taken. Attend each of the following groups, completing one topic per week, to  complete all 6 by due date, unless excused by primary counselor.  All group work to be completed each session to receive an effective outcome.     Narrative:  Client expressing willingness and effort to reestablish BEH therapy 1:1 appts. With outside   provider.    Dimension 4: Treatment Acceptance / Resistance -   Previous Dimension Ratin  Current Dimension Ratin  STACI Diagnosis:  Alcohol Use Disorder   303.90 (F10.20) Moderate In a controlled environment  304.30 (F12.20) Cannabis Use Disorder Severe  In a controlled environment  Opioid Use Disorder, Specify if:  In a  controlled environment with a severity of:  304.00 (F11.20) Severe  Stage - 4  Commitment to tx process/Stage of change- Precontemplation  STACI assignments - Take action to build recovery resilience on weekly basis. Present using history, consequences of use and 5 values violated. and move up stages of change for sobriety. Increase internal motivation for sobriety. Report weekly on recovery program skills put in place to develop recovery resilience.     Narrative - Client reports ambivalence regarding commitment to abstinence from THC. Seems to be moving away from that ambivalent attitude.     Dimension 5: Relapse / Continued Problem Potential -   Previous Dimension Rating:  3  Current Dimension Rating:  3  Relapses this week - YES, List See below for UA lab this week.   Urges to use - YES, List THC and alcohol cravings.   UA results -   No results found for this or any previous visit (from the past 168 hour(s)).    Narrative- Client admitted recent use of Alcohol and THC prior to first appt.   This week. Agreement is he will be doing UA's weekly.     Dimension 6: Recovery Environment -   Previous Dimension Rating:  3  Current Dimension Rating:  3  Family Involvement - None  Summarize attendance at family groups and family sessions - None  Family supportive of treatment?  Yes    Community support group attendance - None  Recreational activities - Outdoor activities with minor daughter.     Narrative - Client reports rebuilding relationships with wife and minor daughter. Reports more interaction.      Justification for Continued Treatment at this Level of Care:  Early sobriety.     Discharge Planning:  Target Discharge Date/Timeframe:  End of September   Med Mgmt Provider/Appt:  See EMR   Ind therapy Provider/Appt:  See EMR for future SUJATA for provider   Family therapy Provider/Appt:  None   Other referrals:  [facility]    Has vulnerable adult status change? No    Service  Coordination:  None    Supervision:  Consulted regarding missed appts. With psychotherapist peer.     Are Treatment Plan goals/objectives effective? Yes  *If no, list changes to treatment plan:    Are the current goals meeting client's needs? Yes  *If no, list the changes to treatment plan.    Client Input / Response: Client is expressing ambivalence about abstinence    *Client agrees with any changes to the treatment plan: Yes  *Client received copy of changes: No  *Client is aware of right to access a treatment plan review: Yes

## 2021-05-07 NOTE — ADDENDUM NOTE
Encounter addended by: Ricardo Britton [facility] on: 5/6/2021 9:15 PM   Actions taken: Clinical Note Signed

## 2021-05-07 NOTE — GROUP NOTE
"Group Therapy Documentation    PATIENT'S NAME: Idris Woodson  MRN:   [id number]  :   1965  ACCT. NUMBER: [id number]  DATE OF SERVICE: 21  START TIME:  5:30 PM  END TIME:  7:30 PM  FACILITATOR(S): Ricardo Britton LADC  TOPIC: BEH Group Therapy  Number of patients attending the group:  10    Group Length:  2 Hours    Group Therapy Type: Addiction and Life skill(s)    Summary of Group / Topics Discussed:    Boundaries, Coping Skills/Lifestyle Managemet, Choices in Recovery, Thinking Errors/Negative Self-Talk, and video and discussion regarding the maintenance of long term recovery - why it's important to take \"the long view\" after STACI treatment. Also group discussion on replacing addiction to chemicals with other negative behaviors vs. Choice to commit to healthy and healing activities for mind, body and spirit.       Group Attendance:  Attended group session    Patient's response to the group topic/interactions:  cooperative with task and discussed personal experience with topic    Patient appeared to be Attentive and Engaged.        Client specific details:  Client started discussion in group regarding the prevalence of substituting a \"less dangerous drug\" than say opioids or meth for primary drug of use. Or conversely, are there non drug behaviors that are equally unhealthy that take the place of the drugs in early sobriety? This counselor and his peers responded with alternative thought that if we're looking for \"good choices vs. Bad choices\" in sobriety, learn from each other. Dimension 4, 5, 6.   "

## 2021-05-10 ENCOUNTER — HOSPITAL ENCOUNTER (OUTPATIENT)
Dept: BEHAVIORAL HEALTH | Facility: CLINIC | Age: 56
End: 2021-05-10
Attending: FAMILY MEDICINE
Payer: COMMERCIAL

## 2021-05-10 PROCEDURE — H2035 A/D TX PROGRAM, PER HOUR: HCPCS | Mod: HQ,GT

## 2021-05-10 NOTE — GROUP NOTE
Group Therapy Documentation    PATIENT'S NAME: Idris Woodson  MRN:   [id number]  :   1965  ACCT. NUMBER: [id number]  DATE OF SERVICE: 5/10/21  START TIME:  5:30 PM  END TIME:  7:30 PM  FACILITATOR(S): Marcus Crocker LADC  TOPIC: BEH Group Therapy  Number of patients attending the group:  11  Group Length:  2 Hours    Group Therapy Type: Life skill(s)    Summary of Group / Topics Discussed:    Frustration and Distress Tolerance: Clients participated in introductions where client shared followed by a brief meditation on gratitude and a reading from the book 24 Hours. A short discussion followed each where clients had the opportunity to share thoughts. Client read in group a piece on the topic ?eveloping frustration tolerance  and processed how address the oversized / extreme reactions to normal stressors and inconveniences of everyday life caused by drugs and alcohol addiction and, in small groups identified issues in their lives that cause frustration. Clients explored solutions that can be used to   build Frustration Tolerance. Clients learned how it is necessary to change their thinking and work through challenges and problems. This relates to Clts Dim 3 and 5 Tx Plan Goals.      Telemedicine Visit: The patient's condition can be safely assessed and treated via synchronous audio and visual telemedicine encounter.      Reason for Telemedicine Visit: Services only offered telehealth    Originating Site (Patient Location): Patient's home    Distant Site (Provider Location): Provider Remote Setting    Consent:  The patient/guardian has verbally consented to: the potential risks and benefits of telemedicine (video visit) versus in person care; bill my insurance or make self-payment for services provided; and responsibility for payment of non-covered services.     Mode of Communication:  Video Conference via [employer]    As the provider I attest to compliance with applicable laws and regulations related  to telemedicine.        Group Attendance:  Attended group session    Patient's response to the group topic/interactions:  cooperative with task    Patient appeared to be Attentive.        Client specific details:  Clt shared his frustrations when people don't return calls or emails and he has to be able to acknowledge he has no idea what may be going on with them that could be keeping them from responding. They could be in their own personal crisis and unable to check messages right away.

## 2021-05-11 ENCOUNTER — HOSPITAL ENCOUNTER (OUTPATIENT)
Dept: BEHAVIORAL HEALTH | Facility: CLINIC | Age: 56
End: 2021-05-11
Attending: FAMILY MEDICINE
Payer: COMMERCIAL

## 2021-05-11 DIAGNOSIS — F19.10 SUBSTANCE ABUSE (H): Primary | ICD-10-CM

## 2021-05-11 PROCEDURE — H2035 A/D TX PROGRAM, PER HOUR: HCPCS | Mod: HQ,GT | Performed by: SOCIAL WORKER

## 2021-05-12 ENCOUNTER — HOSPITAL ENCOUNTER (OUTPATIENT)
Dept: BEHAVIORAL HEALTH | Facility: CLINIC | Age: 56
End: 2021-05-12
Attending: FAMILY MEDICINE
Payer: COMMERCIAL

## 2021-05-12 DIAGNOSIS — F19.10 SUBSTANCE ABUSE (H): Primary | ICD-10-CM

## 2021-05-12 PROCEDURE — H2035 A/D TX PROGRAM, PER HOUR: HCPCS | Mod: HQ

## 2021-05-12 NOTE — GROUP NOTE
Group Therapy Documentation    PATIENT'S NAME: Idris Woodson  MRN:   [id number]  :   1965  ACCT. NUMBER: [id number]  DATE OF SERVICE: 21  START TIME:  5:30 PM  END TIME:  8:30 PM  FACILITATOR(S): Alia Cade LICSW  TOPIC: BEH Group Therapy  Number of patients attending the group:  9  Group Length:  3 Hours    Group Therapy Type: Addiction    Summary of Group / Topics Discussed:    Psychoeducation/Skills How Neurobiology Affects Behavior (IOP)  This topic will give a general overview of the neurobiology of addiction with the purpose of teaching new brain-based coping strategies to reduce the impact of their cravings, urges and distorted memories.  The clients will learn how the midbrain uses memory and associations to create cravings and how to counteract these.     Objective(s):    Clients will identify 3 ways to calm their overactive midbrain and strengthen their frontal cortex to lessen the impact of cravings and urges for addictive substances.      Clients will identify the underlying mechanisms at work to help them detach from the thoughts and emotions that trigger using.     Describe the reward pathway involved in addiction    Identify 1 skill  to counteract cravings and urges to use substances    Structure (modalities, homework, worksheets, etc.):    History of the study of addiction as a disease (PowerPoint)    Psychoeducation on the areas of the midbrain involved in addiction and how the various parts of the brain communicate with each other (PowerPoint)    Psychoeducation and discussion on how the escalation of addiction manifests in personal behaviors leading to negative consequences     Hands on practice of evidenced based strategies to calm the midbrain     Hands on practice of evidenced based strategies to strengthen the frontal cortex    Psychoeducation of anatomy of cravings, urges and addictive thinking and how to counteract this pattern     Show video clip of  "cross-addictions & process addictions    Expected therapeutic outcome(s):     A reduction in shame and guilt due to understanding how addiction influences behavior.    Reduction of use episodes due to using skills to reduce cravings.    Therapeutic outcome(s) measured by:   Teachback and group review and evaluation form  Telemedicine Visit: The patient's condition can be safely assessed and treated via synchronous audio and visual telemedicine encounter.      Reason for Telemedicine Visit: Services only offered telehealth     Originating Site (Patient Location): Patient's home     Distant Site (Provider Location): Provider Remote Setting     Consent:  The patient/guardian has verbally consented to: the potential risks and benefits of telemedicine (video visit) versus in person care; bill my insurance or make self-payment for services provided; and responsibility for payment of non-covered services.      Mode of Communication:  Video Conference via [email or web address]     As the provider I attest to compliance with applicable laws and regulations related to telemedicine.     Group Attendance:  Attended group session    Patient's response to the group topic/interactions:  discussed personal experience with topic and expressed understanding of topic    Patient appeared to be Actively participating.        Client specific details:  Js stated that his week is going well and his stress is between 2-3 \"just normal home stuff.\" His reported no cravings and is not yet familiar with mindfulness which will be the topic next week. Js's feedback on this topic today included that he found it interesting how the brain \"tends to balance the chemicals in it.\" He said this will help him \"be cognizant of the fact that the brain can trick us to get that feeling back.\" This topic completes one of the six MH Skills Groups required under D3 of his Treatment Plan.        "

## 2021-05-13 ENCOUNTER — HOSPITAL ENCOUNTER (OUTPATIENT)
Dept: BEHAVIORAL HEALTH | Facility: CLINIC | Age: 56
End: 2021-05-13
Attending: FAMILY MEDICINE
Payer: COMMERCIAL

## 2021-05-13 DIAGNOSIS — F19.10 SUBSTANCE ABUSE (H): Primary | ICD-10-CM

## 2021-05-13 PROCEDURE — H2035 A/D TX PROGRAM, PER HOUR: HCPCS | Mod: HQ

## 2021-05-13 ASSESSMENT — ANXIETY QUESTIONNAIRES
2. NOT BEING ABLE TO STOP OR CONTROL WORRYING: SEVERAL DAYS
3. WORRYING TOO MUCH ABOUT DIFFERENT THINGS: SEVERAL DAYS
7. FEELING AFRAID AS IF SOMETHING AWFUL MIGHT HAPPEN: NOT AT ALL
GAD7 TOTAL SCORE: 7
5. BEING SO RESTLESS THAT IT IS HARD TO SIT STILL: SEVERAL DAYS
6. BECOMING EASILY ANNOYED OR IRRITABLE: SEVERAL DAYS
1. FEELING NERVOUS, ANXIOUS, OR ON EDGE: SEVERAL DAYS
IF YOU CHECKED OFF ANY PROBLEMS ON THIS QUESTIONNAIRE, HOW DIFFICULT HAVE THESE PROBLEMS MADE IT FOR YOU TO DO YOUR WORK, TAKE CARE OF THINGS AT HOME, OR GET ALONG WITH OTHER PEOPLE: SOMEWHAT DIFFICULT

## 2021-05-13 ASSESSMENT — PATIENT HEALTH QUESTIONNAIRE - PHQ9
5. POOR APPETITE OR OVEREATING: MORE THAN HALF THE DAYS
SUM OF ALL RESPONSES TO PHQ QUESTIONS 1-9: 9

## 2021-05-13 NOTE — ADDENDUM NOTE
[facility]: 5/13/2021 4:14 PM   Actions taken: Clinical Note Signed

## 2021-05-13 NOTE — ADDENDUM NOTE
[facility]: 5/13/2021 2:16 PM   Actions taken: Flowsheet accepted

## 2021-05-13 NOTE — PROGRESS NOTES
[facility] Weekly Treatment Plan Review      ATTENDANCE for the following date span:  5/10 -     Date Monday 5/10 Tuesday 5/11 Wednesday 5/12 Thursday 5/13 Friday    Group Therapy  hours  hours 2.0 hours 2.0 hours  hours   Individual Therapy 2 3      Family Therapy        Other (Specify)            Patient did not have any absences during this time period (list absence dates and reason for absence).        Weekly Treatment Plan Review     Treatment Plan initiated on: 21.    Dimension1: Acute Intoxication/Withdrawal Potential -   Previous Dimension Ratin  Current Dimension Ratin  Date of Last Use Alcohol: from 3/8 to   THC: From 2/15 to   Any reports of withdrawal symptoms - No    Dimension 2: Biomedical Conditions & Complications -   Previous Dimension Ratin  Current Dimension Ratin  Medical Concerns:  See below.   Current Medications & Medication Changes:    Past Medical History:   Diagnosis Date     ADHD      Arthritis      Depressive disorder      Hearing loss      Hypertension      Current Outpatient Medications   Medication     amLODIPine (NORVASC) 10 MG tablet     amphetamine-dextroamphetamine (ADDERALL) 20 MG tablet     citalopram (CELEXA) 40 MG tablet     lisinopril (ZESTRIL) 40 MG tablet     metoprolol succinate ER (TOPROL-XL) 25 MG 24 hr tablet     No current facility-administered medications for this encounter.      Medication Prescriber:  See EMR meds prescribed by Nima Posey MD    [address] [place] [address]    [phone] (Fax)      Taking meds as prescribed? Yes  Medication side effects or concerns: This counselor has attempted to determine therapeutic level of amphetamines In UA test.  Outside medical appointments this week (list provider and reason for visit):  Would be with [facility].     Dimension 3: Emotional/Behavioral Conditions & Complications -   Previous Dimension Ratin  Current Dimension  Ratin  PHQ9     PHQ-9 SCORE 3/15/2021 2021   PHQ-9 Total Score 12 9     GAD7     AMNA-7 SCORE 3/15/2021 2021   Total Score 10 7     Mental health diagnosis: History of trauma issues due his parents getting  when he was 10 years old, due to his brother's death from an alcohol and drug overdose in  and due to be bullied for his hearing loss by peers at school when he was growing up. The patient reported having significant grief and loss issues regarding the deaths of his grandparents and his brother.   Date of last SIB:  None  Date of  last SI:  None  Date of last HI: None    Behavioral Targets:  Improve self-esteem. and Understand the relationship between addiction and mental health issues. Establish and maintain regular appointments with therapist to deal with above issues Learn how to apply self-care and psychotherapy to build a repertoire of daily habits that counteract PAWS, fatigue, depression and anxiety leading to increased resiliency and well-being.     Current MH Assignments:  List 5 ways your addiction has impacted your mental health.  and put actions into place as directed by therapist to deal with above reported issues. Report weekly on acctions taken. Attend each of the following groups, completing one topic per week, to  complete all 6 by due date, unless excused by primary counselor.  All group work to be completed each session to receive an effective outcome.     Narrative:  Client expressing willingness and effort to reestablish BEH therapy 1:1 appts. with outside   provider.    Dimension 4: Treatment Acceptance / Resistance -   Previous Dimension Ratin  Current Dimension Ratin  STACI Diagnosis:  Alcohol Use Disorder   303.90 (F10.20) Moderate In a controlled environment  304.30 (F12.20) Cannabis Use Disorder Severe  In a controlled environment  Opioid Use Disorder, Specify if:  In a controlled environment with a severity of:  304.00 (F11.20) Severe  Stage -  4  Commitment to tx process/Stage of change- Precontemplation  STACI assignments - Take action to build recovery resilience on weekly basis. Present using history, consequences of use and 5 values violated. and move up stages of change for sobriety. Increase internal motivation for sobriety. Report weekly on recovery program skills put in place to develop recovery resilience.     Narrative - Client reports ambivalence regarding commitment to abstinence from THC. Seems to be moving away from that ambivalent attitude.     Dimension 5: Relapse / Continued Problem Potential -   Previous Dimension Rating:  3  Current Dimension Rating:  3  Relapses this week - YES, List See below for UA lab this week.   Urges to use - YES, List THC and alcohol cravings.   UA results -   No results found for this or any previous visit (from the past 168 hour(s)).    Narrative- Client admitted recent use of Alcohol and THC prior to first appt.   In IOP STACI. Agreement is he will be doing UA's weekly.     Dimension 6: Recovery Environment -   Previous Dimension Rating:  3  Current Dimension Ratin  Family Involvement - None  Summarize attendance at family groups and family sessions - None  Family supportive of treatment?  Yes    Community support group attendance - None  Recreational activities - Outdoor activities with minor daughter.     Narrative - Client reports rebuilding relationships with wife and minor daughter. Reports more interaction.      Justification for Continued Treatment at this Level of Care:  Early sobriety.     Discharge Planning:  Target Discharge Date/Timeframe:     Med Mgmt Provider/Appt:  See EMR   Ind therapy Provider/Appt:  See EMR for future SUJATA for provider   Family therapy Provider/Appt:  None   Other referrals:  [facility]    Has vulnerable adult status change? No    Service Coordination:  None    Supervision:  Consulted regarding missed appts. With  psychotherapist peer.     Are Treatment Plan goals/objectives effective? Yes  *If no, list changes to treatment plan:    Are the current goals meeting client's needs? Yes  *If no, list the changes to treatment plan.    Client Input / Response: Client is expressing ambivalence about abstinence    *Client agrees with any changes to the treatment plan: Yes  *Client received copy of changes: No  *Client is aware of right to access a treatment plan review: Yes

## 2021-05-13 NOTE — GROUP NOTE
"Group Therapy Documentation    PATIENT'S NAME: Idris Woodson  MRN:   [id number]  :   1965  ACCT. NUMBER: [id number]  DATE OF SERVICE: 21  START TIME:  5:30 PM  END TIME:  7:30 PM  FACILITATOR(S): Ricardo Britton LADC  TOPIC: BEH Group Therapy  Number of patients attending the group:  7    Group Length:  2 Hours    Group Therapy Type: Addiction    Summary of Group / Topics Discussed:    Co-occurring Illness, Choices in Recovery, Symptom Management, and PHQ and AMNA questionaire/updates, Breathalyzer test for group,   \"Wasted\" Documentary and discussion regarding using multiple \"tools in the toolbelt\" for recovery vs. Focusing on only one or two and   Assuming best outcomes.         Group Attendance:  Attended group session    Patient's response to the group topic/interactions:  confronted peers appropriately and cooperative with task    Patient appeared to be Attentive and Engaged.        Client specific details:  Client answered counselor question about ethics and intent of breathalyzer tests in STACI treatment. Client expressed understanding of validating hard work being done in STACI treatment vs. Trying to \"catch\" somebody randomly. Dimension 3, 4, 5  "

## 2021-05-14 ASSESSMENT — ANXIETY QUESTIONNAIRES: GAD7 TOTAL SCORE: 7

## 2021-05-14 NOTE — GROUP NOTE
"Group Therapy Documentation    PATIENT'S NAME: Idris Woodson  MRN:   [id number]  :   1965  ACCT. NUMBER: [id number]  DATE OF SERVICE: 21  START TIME:  5:30 PM  END TIME:  7:30 PM  FACILITATOR(S): Ricardo Britton LADC  TOPIC: BEH Group Therapy  Number of patients attending the group:  7    Group Length:  2 Hours    Group Therapy Type: Life skill(s)    Summary of Group / Topics Discussed:    Coping Skills/Lifestyle Managemet, Choices in Recovery, and Distress Tolerance Group discussion regarding   Stigmatizing and trigger producing portrayals of addiction and addicts in the media. Cultural impact and glorification  Of substance use on people in addiction as well as influence on people without STACI and formation of their opinions.       Group Attendance:  Attended group session    Patient's response to the group topic/interactions:  cooperative with task and discussed personal experience with topic    Patient appeared to be Engaged.        Client specific details:  Client shared his past personal experience with using LSD and associated \"trips\". Answered questions from peers regarding \"good trip vs. Bad trip\" and what he remembers about each kind. Client expressed understanding and personal experience with mixing amphetamines with LSD and cumulative danger associated with that behavior. Dimension 2, 3, 5.  "

## 2021-05-17 ENCOUNTER — HOSPITAL ENCOUNTER (OUTPATIENT)
Dept: BEHAVIORAL HEALTH | Facility: CLINIC | Age: 56
End: 2021-05-17
Attending: FAMILY MEDICINE
Payer: COMMERCIAL

## 2021-05-17 DIAGNOSIS — F19.10 SUBSTANCE ABUSE (H): Primary | ICD-10-CM

## 2021-05-17 PROCEDURE — 80320 DRUG SCREEN QUANTALCOHOLS: CPT | Performed by: FAMILY MEDICINE

## 2021-05-17 PROCEDURE — 80359 METHYLENEDIOXYAMPHETAMINES: CPT | Performed by: FAMILY MEDICINE

## 2021-05-17 PROCEDURE — 80307 DRUG TEST PRSMV CHEM ANLYZR: CPT | Performed by: FAMILY MEDICINE

## 2021-05-17 PROCEDURE — 80324 DRUG SCREEN AMPHETAMINES 1/2: CPT | Performed by: FAMILY MEDICINE

## 2021-05-17 PROCEDURE — H2035 A/D TX PROGRAM, PER HOUR: HCPCS | Mod: HQ

## 2021-05-17 PROCEDURE — 80349 CANNABINOIDS NATURAL: CPT | Performed by: FAMILY MEDICINE

## 2021-05-18 ENCOUNTER — HOSPITAL ENCOUNTER (OUTPATIENT)
Dept: BEHAVIORAL HEALTH | Facility: CLINIC | Age: 56
End: 2021-05-18
Attending: FAMILY MEDICINE
Payer: COMMERCIAL

## 2021-05-18 DIAGNOSIS — F19.10 SUBSTANCE ABUSE (H): Primary | ICD-10-CM

## 2021-05-18 LAB
AMPHETAMINES UR QL SCN: POSITIVE
BARBITURATES UR QL: NEGATIVE
BENZODIAZ UR QL: NEGATIVE
CANNABINOIDS UR QL SCN: POSITIVE
COCAINE UR QL: NEGATIVE
CREAT UR-MCNC: 137 MG/DL
ETHANOL UR QL SCN: NEGATIVE
OPIATES UR QL SCN: POSITIVE
PCP UR QL SCN: NEGATIVE

## 2021-05-18 PROCEDURE — H2035 A/D TX PROGRAM, PER HOUR: HCPCS | Mod: HQ,GT | Performed by: SOCIAL WORKER

## 2021-05-18 NOTE — GROUP NOTE
Group Therapy Documentation    PATIENT'S NAME: Idris Woodson  MRN:   [id number]  :   1965  ACCT. NUMBER: [id number]  DATE OF SERVICE: 21  START TIME:  5:30 PM  END TIME:  7:30 PM  FACILITATOR(S): Ricardo Britton LADC  TOPIC: BEH Group Therapy  Number of patients attending the group:  6    Group Length:  2 Hours    Group Therapy Type: Life skill(s) and Values in active addiction and values in recovery discussion.     Summary of Group / Topics Discussed:    Choices in Recovery, Self-Esteem/Self [place], and Thinking Errors/Negative Self-Talk      Group Attendance:  Attended group session    Patient's response to the group topic/interactions:  confronted peers appropriately    Patient appeared to be Attentive and Engaged.        Client specific details:  Client and counselor met to discuss his use of THC and alcohol since starting group . Client has been avoiding UA tests as required by this counselor, and admitted nightly use of THC. Client and counselor spoke 1:1 about options to use of alcohol and THC as sedative to get to sleep and agreed on regular tests going forward to monitor abstinence from all mood altering chemicals. Dimension 1, 5.

## 2021-05-19 ENCOUNTER — HOSPITAL ENCOUNTER (OUTPATIENT)
Dept: BEHAVIORAL HEALTH | Facility: CLINIC | Age: 56
End: 2021-05-19
Attending: FAMILY MEDICINE
Payer: COMMERCIAL

## 2021-05-19 DIAGNOSIS — F19.10 SUBSTANCE ABUSE (H): Primary | ICD-10-CM

## 2021-05-19 PROCEDURE — H2035 A/D TX PROGRAM, PER HOUR: HCPCS | Mod: HQ

## 2021-05-19 NOTE — GROUP NOTE
Group Therapy Documentation    PATIENT'S NAME: Idris Woodson  MRN:   [id number]  :   1965  ACCT. NUMBER: [id number]  DATE OF SERVICE: 21  START TIME:  5:30 PM  END TIME:  8:30 PM  FACILITATOR(S): Alia Cade LICSW  TOPIC: BEH Group Therapy  Number of patients attending the group:  7  Group Length:  3 Hours    Group Therapy Type: Addiction    Summary of Group / Topics Discussed:    Psychoeducation/Skills Mindfulness in Action (IOP)  Learn what mindfulness is and how to practice it. Learn how to increase awareness of the present moment, thereby reducing ruminating thoughts and learn how to embrace negative emotions instead of suppressing or avoiding them. Consistent practice has been shown to decrease reactivity and help facilitate effective action to make positive changes in behavior.     Objective(s):    Identify 2 skills to increase awareness of the present moment to reduce negative impact of ruminating thoughts    Describe how to embrace negative emotions instead of suppressing or avoiding them.    Give 1 example of how mindfulness works in the brain to calm the Survival part of the Midbrain (which is overactive in addiction)    Give1 example of how mindfulness practice may decrease reactivity and facilitate effective action    Structure (modalities, homework, worksheets, etc.):    Complete  before and after  worksheet for mindfulness practice ([place] Exercise)    Participate in Awareness of Sounds meditation    Participate in Mindful Eating meditation    Complete Mindful Listening exercise with other group member(s) (Dyads or Triads)    Participate in Sitting Meditation     Expected therapeutic outcome(s):     Increased ability to remain in present moment    Increased ability to tolerate negative emotions without returning to addiction    Therapeutic outcome(s) measured by:   Teachback and group review and evaluation form.  Telemedicine Visit: The patient's condition can be safely  "assessed and treated via synchronous audio and visual telemedicine encounter.      Reason for Telemedicine Visit: Services only offered telehealth     Originating Site (Patient Location): Patient's home     Distant Site (Provider Location): Provider Remote Setting     Consent:  The patient/guardian has verbally consented to: the potential risks and benefits of telemedicine (video visit) versus in person care; bill my insurance or make self-payment for services provided; and responsibility for payment of non-covered services.      Mode of Communication:  Video Conference via [employer]     As the provider I attest to compliance with applicable laws and regulations related to telemedicine.      Group Attendance:  Attended group session    Patient's response to the group topic/interactions:  discussed personal experience with topic, expressed readiness to alter behaviors and expressed understanding of topic    Patient appeared to be Attentive and Engaged.        Client specific details:  Js shared that he used cannabis to help him sleep and for pain issues he has. He understands that this is an abstinence program and feels bad about this situation. He will be going to his MD to see if he can get back into a sleep study. He also has restless leg syndrome and the cannabis helps with this as well. We spoke of cross addiction and how another substance can \"light up that network\" again and he said he understood this. His stress is 4-5 mainly about using the cannabis which he says he does not have cravings for, nor for alcohol as the opiates were the only issue and he has not had any cravings for the opiates either. He liked the mindful listening and said \"It funneled into a political conversation and we have similar beliefs.\" His [place] exercise was about going back to work and how difficult it has been for him with people wearing masks as he is hard of hearing uses lip-reading to help him communicate. His page 2 was " more optimistic. This topic completes one of the six MH Skills Groups required under D3 of his Treatment Plan.

## 2021-05-19 NOTE — LETTER
Leora Warner  [facility]    5/20/2021    Idris Woodson  1965      I am writing to ask for your assistance in care coordination for the above mutual client. I have attached a signed SUJATA from Idris in this fax. Idris has been attending the [facility] adult evening program in [place] since his admission April 19.     I am currently attempting to work with his Primary Care Physician regarding an ongoing prescription he has open for amphetamine-dextroamphetamine (ADDERALL) 20 MG tablet and if the PCP feels Idris is using those meds as prescribed or not. I would greatly appreciate any documentation you could share with me regarding a Diagnostic Assessment and regular Progress Notes. My fax number is; [phone].     I am asking for your feedback because in addition to testing positive recently for amphetamines (prescribed), you can see he is testing positive for THC and Opiates, which are not prescribed. He is also admitting to using alcohol periodically. 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 therapeutic purposes.     Thanks very much for your time. I look forward to your feedback. I can be reached at [phone].             JESUS Simmons

## 2021-05-20 ENCOUNTER — HOSPITAL ENCOUNTER (OUTPATIENT)
Dept: BEHAVIORAL HEALTH | Facility: CLINIC | Age: 56
End: 2021-05-20
Attending: FAMILY MEDICINE
Payer: COMMERCIAL

## 2021-05-20 DIAGNOSIS — F19.10 SUBSTANCE ABUSE (H): Primary | ICD-10-CM

## 2021-05-20 PROCEDURE — H2035 A/D TX PROGRAM, PER HOUR: HCPCS | Mod: HQ

## 2021-05-20 NOTE — GROUP NOTE
Group Therapy Documentation    PATIENT'S NAME: Idris Woodson  MRN:   [id number]  :   1965  ACCT. NUMBER: [id number]  DATE OF SERVICE: 21  START TIME:  5:00 PM  END TIME:  7:30 PM  FACILITATOR(S): Ricardo Britton LADC  TOPIC: BEH Group Therapy  Number of patients attending the group:  7    Group Length:  2 Hours    Group Therapy Type: Addiction and Life skill(s)    Summary of Group / Topics Discussed:    Psychoeducation/Skills Values, Connection and Committee Action (IOP)  Clients learn key concepts of traditional CBT and build on these by learning three core concepts of third wave therapies (Values, Committed Action, and Self-as-Context) and how to apply these in recovery.     Objective(s):    Identify 2 reasons it is important to re-identify and prioritize values that guide behaviors.    Identify 2 ways that the escalation of  addiction may change previous values-based behaviors in negative ways    Identify 3 positive impacts of making connections socially, environmentally, and within the community     Structure (modalities, homework, worksheets, etc.):    Group activity-mapping psychological flexibility     Excerpts from documentary on importance of connections    Group activity-brainstorming universal and personal values     Values worksheet with Value, Goal, Step to take this week    Expected therapeutic outcome(s):     Reinstatement of values that may have been overlooked in active addiction    Reduction of guilt and remorse     Increased satisfaction in relationships and connections    Improved mood and sense of belonging     Therapeutic outcome(s) measured by:   Teachback and Group Review       Group Attendance:  Attended group session    Patient's response to the group topic/interactions:  cooperative with task and discussed personal experience with topic    Patient appeared to be Attentive and Engaged.        Client specific details:  Client shared he values being a dependable person, but  "right now for example is on JAVIER from his job because of his inability to perform job duties satisfactorily. Client shared during check in that he has been using THC and alcohol \"under the radar\" since starting group four weeks ago. See EMR for details. Dimension 1, 3, 4..  "

## 2021-05-21 LAB
CANNABINOIDS UR CFM-MCNC: 172 NG/ML
CARBOXYTHC/CREAT UR: 126 NG/MG{CREAT}

## 2021-05-21 NOTE — ADDENDUM NOTE
Encounter addended by: Ricardo Britton [facility] on: 5/21/2021 1:24 PM   Actions taken: Clinical Note Signed

## 2021-05-21 NOTE — PROGRESS NOTES
[facility] Weekly Treatment Plan Review      ATTENDANCE for the following date span:   -     Date     Group Therapy 2 hours  3 hours 2.0 hours 2.0 hours  hours   Individual Therapy        Family Therapy        Other (Specify)            Patient did not have any absences during this time period (list absence dates and reason for absence).        Weekly Treatment Plan Review     Treatment Plan initiated on: 21.    Dimension1: Acute Intoxication/Withdrawal Potential -   Previous Dimension Ratin  Current Dimension Ratin  Date of Last Use Alcohol: from 3/8 to   THC: From 2/15 to  Opioids (pills) likely .   Any reports of withdrawal symptoms - No    Dimension 2: Biomedical Conditions & Complications -   Previous Dimension Ratin  Current Dimension Ratin  Medical Concerns:  See below.   Current Medications & Medication Changes:    Past Medical History:   Diagnosis Date     ADHD      Arthritis      Depressive disorder      Hearing loss      Hypertension      Current Outpatient Medications   Medication     amLODIPine (NORVASC) 10 MG tablet     amphetamine-dextroamphetamine (ADDERALL) 20 MG tablet     citalopram (CELEXA) 40 MG tablet     lisinopril (ZESTRIL) 40 MG tablet     metoprolol succinate ER (TOPROL-XL) 25 MG 24 hr tablet     No current facility-administered medications for this encounter.      Medication Prescriber:  See EMR meds prescribed by Nima Posey MD    [address] [facility] [place] [address]    [phone] (Fax)      Taking meds as prescribed? Yes  VM  from Dr. Posey indicates he believes client is taking Adderall as prescribed and has not gotten early renewals.     Medication side effects or concerns: This counselor has attempted to determine therapeutic level of amphetamines In UA test.    Outside medical appointments this week (list provider and reason for  visit):  Would be with [facility].     Dimension 3: Emotional/Behavioral Conditions & Complications -   Previous Dimension Ratin  Current Dimension Ratin  PHQ9     PHQ-9 SCORE 3/15/2021 2021   PHQ-9 Total Score 12 9     GAD7     AMNA-7 SCORE 3/15/2021 2021   Total Score 10 7     Mental health diagnosis: History of trauma issues due his parents getting  when he was 10 years old, due to his brother's death from an alcohol and drug overdose in  and due to be bullied for his hearing loss by peers at school when he was growing up. The patient reported having significant grief and loss issues regarding the deaths of his grandparents and his brother.   Date of last SIB:  None  Date of  last SI:  None  Date of last HI: None    Behavioral Targets:  Improve self-esteem. and Understand the relationship between addiction and mental health issues. Establish and maintain regular appointments with therapist to deal with above issues Learn how to apply self-care and psychotherapy to build a repertoire of daily habits that counteract PAWS, fatigue, depression and anxiety leading to increased resiliency and well-being.     Current MH Assignments:  List 5 ways your addiction has impacted your mental health.  and put actions into place as directed by therapist to deal with above reported issues. Report weekly on acctions taken. Attend each of the following groups, completing one topic per week, to  complete all 6 by due date, unless excused by primary counselor.  All group work to be completed each session to receive an effective outcome.     Narrative:  Client expressing willingness and effort to reestablish BEH therapy 1:1 appts. with outside   provider.    Dimension 4: Treatment Acceptance / Resistance -   Previous Dimension Ratin  Current Dimension Ratin  STACI Diagnosis:  Alcohol Use Disorder   303.90 (F10.20) Moderate In a controlled environment  304.30 (F12.20) Cannabis Use Disorder  Severe  In a controlled environment  Opioid Use Disorder, Specify if:  In a controlled environment with a severity of:  304.00 (F11.20) Severe  Stage - 4  Commitment to tx process/Stage of change- Precontemplation  STACI assignments - Take action to build recovery resilience on weekly basis. Present using history, consequences of use and 5 values violated. and move up stages of change for sobriety. Increase internal motivation for sobriety. Report weekly on recovery program skills put in place to develop recovery resilience.     Narrative - Client reports ambivalence regarding commitment to abstinence from THC. Client     Dimension 5: Relapse / Continued Problem Potential -   Previous Dimension Rating:  3  Current Dimension Rating:  3  Relapses this week - YES, List See below for UA lab this week.   Urges to use - YES, List THC and alcohol cravings.   UA results -     Recent Results (from the past 168 hour(s))   Drug abuse screen 8 urine (UR)    Collection Time: 05/17/21  6:00 PM   Result Value Ref Range    Amphetamine Qual Urine Positive (A) NEG^Negative    Barbiturates Qual Urine Negative NEG^Negative    Benzodiazepine Qual Urine Negative NEG^Negative    Cannabinoids Qual Urine Positive (A) NEG^Negative    Cocaine Qual Urine Negative NEG^Negative    Ethanol Qual Urine Negative NEG^Negative    Opiates Qualitative Urine Positive (A) NEG^Negative    PCP Qual Urine Negative NEG^Negative   THC Confirmation Quantitative Urine    Collection Time: 05/17/21  6:00 PM   Result Value Ref Range    THC Metabolite 172 ng/mL    THC/Creatinine Ratio 126 ng/mg[creat]   Creatinine random urine    Collection Time: 05/17/21  6:00 PM   Result Value Ref Range    Creatinine Urine Random 137 mg/dL       Narrative- Client admitted recent use of Alcohol and THC prior to first appt.   In IOP STACI. Agreement is he will be doing UA's weekly.     Dimension 6: Recovery Environment -   Previous Dimension Rating:  3  Current Dimension Rating:   2  Family Involvement - None  Summarize attendance at family groups and family sessions - None  Family supportive of treatment?  Yes    Community support group attendance - None    Recreational activities - Outdoor activities with minor daughter.     Narrative - Client reports rebuilding relationships with wife and minor daughter. Reports more interaction.      Justification for Continued Treatment at this Level of Care:  Early sobriety.     Discharge Planning:  Target Discharge Date/Timeframe:  End of September   Med Mgmt Provider/Appt:  See EMR   Ind therapy Provider/Appt:  See EMR for future SUJATA for provider   Family therapy Provider/Appt:  None   Other referrals:  [facility]    Has vulnerable adult status change? No    Service Coordination:  Yes with prescribing provider for adderall and attempted contact  With [facility] for recent notes.     Supervision:  None     Are Treatment Plan goals/objectives effective? Yes  *If no, list changes to treatment plan:    Are the current goals meeting client's needs? Yes  *If no, list the changes to treatment plan.    Client Input / Response: Client is expressing ambivalence about abstinence    *Client agrees with any changes to the treatment plan: Yes  *Client received copy of changes: No  *Client is aware of right to access a treatment plan review: Yes

## 2021-05-21 NOTE — GROUP NOTE
"Group Therapy Documentation    PATIENT'S NAME: Idris Woodson  MRN:   [id number]  :   1965  ACCT. NUMBER: [id number]  DATE OF SERVICE: 21  START TIME:  5:30 PM  END TIME:  7:30 PM  FACILITATOR(S): Ricardo Britton LADC  TOPIC: BEH Group Therapy  Number of patients attending the group:  8    Group Length:  2 Hours    Group Therapy Type: Skills/Education    Summary of Group / Topics Discussed:    Coping Skills/Lifestyle Managemet group discussion regarding relapse prevention/relapse recovery each   Group member is committing to put in place to reduce the risk or minimize the effect of relapse in early   Recovery. Based on what they have learned and/or put in practice in their own lives to date in sobriety.           Group Attendance:  Attended group session    Patient's response to the group topic/interactions:  confronted peers appropriately and expressed readiness to alter behaviors    Patient appeared to be Engaged.        Client specific details:  Client shared he flushed \"a lot\" of marijuana down the toilet and got rid of all his smoking paraphernalia this week. Client reports regular attendance at 1:1 psychotherapy sessions. Client reports asking his family to hold him accountable when they have concerns about his behavior. Client reports using mindfulness apps on his phone . Dimension 3, 4, 5, 6  "

## 2021-05-24 ENCOUNTER — HOSPITAL ENCOUNTER (OUTPATIENT)
Dept: BEHAVIORAL HEALTH | Facility: CLINIC | Age: 56
End: 2021-05-24
Attending: FAMILY MEDICINE
Payer: COMMERCIAL

## 2021-05-24 DIAGNOSIS — F19.10 SUBSTANCE ABUSE (H): Primary | ICD-10-CM

## 2021-05-24 PROCEDURE — 80307 DRUG TEST PRSMV CHEM ANLYZR: CPT | Performed by: FAMILY MEDICINE

## 2021-05-24 PROCEDURE — H2035 A/D TX PROGRAM, PER HOUR: HCPCS | Mod: HQ

## 2021-05-24 PROCEDURE — 80349 CANNABINOIDS NATURAL: CPT | Performed by: FAMILY MEDICINE

## 2021-05-24 PROCEDURE — 80320 DRUG SCREEN QUANTALCOHOLS: CPT | Performed by: FAMILY MEDICINE

## 2021-05-24 PROCEDURE — 80324 DRUG SCREEN AMPHETAMINES 1/2: CPT | Performed by: FAMILY MEDICINE

## 2021-05-24 PROCEDURE — 80359 METHYLENEDIOXYAMPHETAMINES: CPT | Performed by: FAMILY MEDICINE

## 2021-05-24 NOTE — ADDENDUM NOTE
[facility]: 5/24/2021 3:22 PM   Actions taken: Charge Capture section accepted

## 2021-05-25 LAB
AMPHETAMINES UR QL SCN: POSITIVE
BARBITURATES UR QL: NEGATIVE
BENZODIAZ UR QL: NEGATIVE
CANNABINOIDS UR QL SCN: POSITIVE
COCAINE UR QL: NEGATIVE
CREAT UR-MCNC: 94 MG/DL
ETHANOL UR QL SCN: NEGATIVE
OPIATES UR QL SCN: NEGATIVE
PCP UR QL SCN: NEGATIVE

## 2021-05-25 NOTE — GROUP NOTE
"Group Therapy Documentation    PATIENT'S NAME: Idris Woodson  MRN:   [id number]  :   1965  ACCT. NUMBER: [id number]  DATE OF SERVICE: 21  START TIME:  5:30 PM  END TIME:  7:30 PM  FACILITATOR(S): Ricardo Britton LADC  TOPIC: BEH Group Therapy  Number of patients attending the group:  7    Group Length:  2 Hours    Group Therapy Type: Addiction and Life skill(s)    Summary of Group / Topics Discussed:    Balanced Lifestyle , Boundaries, Coping Skills/Lifestyle Managemet, Choices in Recovery, Meditation/Breathing Exercises, Mindfulness, Symptom Management, and Criminalization of Addiction  and stigma.       Group Attendance:  Attended group session    Patient's response to the group topic/interactions:  confronted peers appropriately and cooperative with task    Patient appeared to be Attentive and Engaged.        Client specific details:  Client checked in and expressed he had first good night of sleep over weekend by using Adia prescribed for his wife since he was \"out\". Lead to discussion regarding not using prescriptions assigned to someone else and client making commitment to talk with his own doctor about upping dose \"officially\" for Adia in the near future. Dimension 2, 5.   "

## 2021-05-26 ENCOUNTER — HOSPITAL ENCOUNTER (OUTPATIENT)
Dept: BEHAVIORAL HEALTH | Facility: CLINIC | Age: 56
End: 2021-05-26
Attending: FAMILY MEDICINE
Payer: COMMERCIAL

## 2021-05-26 DIAGNOSIS — F19.10 SUBSTANCE ABUSE (H): Primary | ICD-10-CM

## 2021-05-26 PROCEDURE — H2035 A/D TX PROGRAM, PER HOUR: HCPCS | Mod: HQ

## 2021-05-27 ENCOUNTER — HOSPITAL ENCOUNTER (OUTPATIENT)
Dept: BEHAVIORAL HEALTH | Facility: CLINIC | Age: 56
End: 2021-05-27
Attending: FAMILY MEDICINE
Payer: COMMERCIAL

## 2021-05-27 DIAGNOSIS — F19.10 SUBSTANCE ABUSE (H): Primary | ICD-10-CM

## 2021-05-27 LAB
AMPHET UR CFM-MCNC: 8642 NG/ML
AMPHET UR QL CFM: NORMAL
ETHYL GLUCURONIDE UR QL: NEGATIVE

## 2021-05-27 PROCEDURE — H2035 A/D TX PROGRAM, PER HOUR: HCPCS | Mod: HQ

## 2021-05-27 NOTE — GROUP NOTE
"Group Therapy Documentation    PATIENT'S NAME: Idris Woodson  MRN:   [id number]  :   1965  ACCT. NUMBER: [id number]  DATE OF SERVICE: 21  START TIME:  5:30 PM  END TIME:  7:30 PM  FACILITATOR(S): Ricardo Britton LADC  TOPIC: BEH Group Therapy  Number of patients attending the group:  7    Group Length:  2 Hours    Group Therapy Type: Addiction and Psychoeducation    Summary of Group / Topics Discussed:    Stigma in addiction as encountered in numerous areas of social environment: Medical or other professional.       Group Attendance:  Attended group session    Patient's response to the group topic/interactions:  cooperative with task and discussed personal experience with topic    Patient appeared to be Attentive and Engaged.        Client specific details:  Client talked about his reluctance to share recent use episodes for alcohol and THC with his family. Client feels guilty and somewhat shamed by his inability to do so. Group and counselor discussed \"age appropriate\" level of information to share with teenage children as well as framing disclosure of relapse with SO as asking for help in preventing future episodes of use. Dimension 4, 5, 6.  "

## 2021-05-27 NOTE — PROGRESS NOTES
[facility] Weekly Treatment Plan Review      ATTENDANCE for the following date span:   -     Date     Group Therapy 2 hours  0 hours 2.0 hours 2.0 hours  hours   Individual Therapy        Family Therapy        Other (Specify)            Patient did not have any absences during this time period (list absence dates and reason for absence).        Weekly Treatment Plan Review     Treatment Plan initiated on: 21.    Dimension1: Acute Intoxication/Withdrawal Potential -   Previous Dimension Ratin  Current Dimension Ratin  Date of Last Use Alcohol: from 3/8 to   THC: From 2/15 to  Opioids (pills) likely .   Any reports of withdrawal symptoms - No    Dimension 2: Biomedical Conditions & Complications -   Previous Dimension Ratin  Current Dimension Ratin  Medical Concerns:  See below.   Current Medications & Medication Changes:    Past Medical History:   Diagnosis Date     ADHD      Arthritis      Depressive disorder      Hearing loss      Hypertension      Current Outpatient Medications   Medication     amLODIPine (NORVASC) 10 MG tablet     amphetamine-dextroamphetamine (ADDERALL) 20 MG tablet     citalopram (CELEXA) 40 MG tablet     lisinopril (ZESTRIL) 40 MG tablet     metoprolol succinate ER (TOPROL-XL) 25 MG 24 hr tablet     No current facility-administered medications for this encounter.      Medication Prescriber:  See EMR meds prescribed by Nima Posey MD    [address] [facility] [place] [address]    [phone] (Fax)      Taking meds as prescribed? Yes  VM  from Dr. Posey indicates he believes client is taking Adderall as prescribed and has not gotten early renewals.     Medication side effects or concerns: This counselor has attempted to determine therapeutic level of amphetamines In UA test.      Outside medical appointments this week (list provider and reason for  visit):  Would be with [facility].     Dimension 3: Emotional/Behavioral Conditions & Complications -   Previous Dimension Ratin  Current Dimension Ratin  PHQ9     PHQ-9 SCORE 3/15/2021 2021   PHQ-9 Total Score 12 9     GAD7     AMNA-7 SCORE 3/15/2021 2021   Total Score 10 7     Mental health diagnosis: History of trauma issues due his parents getting  when he was 10 years old, due to his brother's death from an alcohol and drug overdose in  and due to be bullied for his hearing loss by peers at school when he was growing up. The patient reported having significant grief and loss issues regarding the deaths of his grandparents and his brother.     Date of last SIB:  None  Date of  last SI:  None  Date of last HI: None    Behavioral Targets:  Improve self-esteem. and Understand the relationship between addiction and mental health issues. Establish and maintain regular appointments with therapist to deal with above issues Learn how to apply self-care and psychotherapy to build a repertoire of daily habits that counteract PAWS, fatigue, depression and anxiety leading to increased resiliency and well-being.     Current MH Assignments:  List 5 ways your addiction has impacted your mental health.  and put actions into place as directed by therapist to deal with above reported issues. Report weekly on acctions taken. Attend each of the following groups, completing one topic per week, to  complete all 6 by due date, unless excused by primary counselor.  All group work to be completed each session to receive an effective outcome.     Narrative:  Client expressing willingness and effort to reestablish BEH therapy 1:1 appts. with outside   Provider. Have faxed SUJATA to psych provider, but no response.     Dimension 4: Treatment Acceptance / Resistance -   Previous Dimension Ratin  Current Dimension Ratin    STACI Diagnosis:  Alcohol Use Disorder   303.90 (F10.20) Moderate In a  controlled environment  304.30 (F12.20) Cannabis Use Disorder Severe  In a controlled environment  Opioid Use Disorder, Specify if:  In a controlled environment with a severity of:  304.00 (F11.20) Severe  Stage - 4  Commitment to tx process/Stage of change- Precontemplation  STACI assignments - Take action to build recovery resilience on weekly basis. Present using history, consequences of use and 5 values violated. and move up stages of change for sobriety. Increase internal motivation for sobriety. Report weekly on recovery program skills put in place to develop recovery resilience.     Narrative - Client reports ambivalence regarding commitment to abstinence from THC. Client will be doing UA test (see below) to assure abstinence.     Dimension 5: Relapse / Continued Problem Potential -   Previous Dimension Rating:  3  Current Dimension Rating:  3  Relapses this week - YES, List See below for UA lab this week.   Urges to use - YES, List THC and alcohol cravings.   UA results -     Recent Results (from the past 168 hour(s))   Drug abuse screen 8 urine (UR)    Collection Time: 21  6:00 PM   Result Value Ref Range    Amphetamine Qual Urine Positive (A) NEG^Negative    Barbiturates Qual Urine Negative NEG^Negative    Benzodiazepine Qual Urine Negative NEG^Negative    Cannabinoids Qual Urine Positive (A) NEG^Negative    Cocaine Qual Urine Negative NEG^Negative    Ethanol Qual Urine Negative NEG^Negative    Opiates Qualitative Urine Negative NEG^Negative    PCP Qual Urine Negative NEG^Negative   Creatinine random urine    Collection Time: 21  6:00 PM   Result Value Ref Range    Creatinine Urine Random 94 mg/dL       Narrative- Client admitted recent use of Alcohol and THC prior to first appt.   In IOP STACI. Agreement is he will be doing UA's weekly.     Dimension 6: Recovery Environment -   Previous Dimension Rating:  3  Current Dimension Ratin  Family Involvement - None  Summarize attendance at family  groups and family sessions - None  Family supportive of treatment?  Yes    Community support group attendance - None    Recreational activities - Outdoor activities with minor daughter.     Narrative - Client reports rebuilding relationships with wife and minor daughter. Reports more interaction.      Justification for Continued Treatment at this Level of Care:  Early sobriety.     Discharge Planning:  Target Discharge Date/Timeframe:  End of September   Med Mgmt Provider/Appt:  See EMR   Ind therapy Provider/Appt:  See EMR for future SUJATA for provider   Family therapy Provider/Appt:  None   Other referrals:  [facility]    Has vulnerable adult status change? No    Service Coordination:  Yes with prescribing provider for adderall and attempted contact  With [facility] for recent notes.     Supervision:  None     Are Treatment Plan goals/objectives effective? Yes  *If no, list changes to treatment plan:    Are the current goals meeting client's needs? Yes  *If no, list the changes to treatment plan.    Client Input / Response: Client is expressing ambivalence about abstinence    *Client agrees with any changes to the treatment plan: Yes  *Client received copy of changes: No  *Client is aware of right to access a treatment plan review: Yes

## 2021-05-28 LAB
CANNABINOIDS UR CFM-MCNC: 53 NG/ML
CARBOXYTHC/CREAT UR: 56 NG/MG{CREAT}

## 2021-05-28 NOTE — GROUP NOTE
"Group Therapy Documentation    PATIENT'S NAME: Idris Woodson  MRN:   [id number]  :   1965  ACCT. NUMBER: [id number]  DATE OF SERVICE: 21  START TIME:  5:30 PM  END TIME:  7:30 PM  FACILITATOR(S): Ricardo Britton LADC  TOPIC: BEH Group Therapy  Number of patients attending the group:  8    Group Length:  2 Hours    Group Therapy Type: Skills/Education    Summary of Group / Topics Discussed:    Coping Skills/Lifestyle Managemet, Choices in Recovery, Distress Tolerance, Self-Care Activities, Stress Management, and tips and tricks to maintain sobriety during the holidays. Discussion about what has worked and what has not worked for periods of abstinence in past similar situations.       Group Attendance:  Attended group session    Patient's response to the group topic/interactions:  confronted peers appropriately and cooperative with task    Patient appeared to be Attentive and Engaged.        Client specific details:  Client shared he is concerned about wife being gone over upcoming weekend and triggers to use THC. Client intends to keep busy and not be \"bored\". Counselor, group and client discussed finding and attending sober support meetings this weekend before the possibility of cravings to use present themselves. Dimension 4, 5, 6  "

## 2021-05-30 ENCOUNTER — HEALTH MAINTENANCE LETTER (OUTPATIENT)
Age: 56
End: 2021-05-30

## 2021-06-01 ENCOUNTER — TELEPHONE (OUTPATIENT)
Dept: BEHAVIORAL HEALTH | Facility: CLINIC | Age: 56
End: 2021-06-01

## 2021-06-01 DIAGNOSIS — F19.10 SUBSTANCE ABUSE (H): Primary | ICD-10-CM

## 2021-06-01 NOTE — TELEPHONE ENCOUNTER
----- Message from JESUS Alcaraz sent at 6/1/2021  1:03 PM CDT -----  Regarding: Set up IOP STACI Phase II appointments for client at [facility]  Patient Name:  See above  Location of programming: [facility] [place]  Start Date: 6/3/2021  Group: ([id number] M, W, TH  Provider: JESUS Prasad  Number of visits to be scheduled: 15   Length/Duration of Appointment in minutes: 120   Visit Type (VIDEO/TELEPHONE/IN-PERSON):  In person  Additional notes:    Thanks    JESUS Prasad

## 2021-06-02 ENCOUNTER — HOSPITAL ENCOUNTER (OUTPATIENT)
Dept: BEHAVIORAL HEALTH | Facility: CLINIC | Age: 56
End: 2021-06-02
Attending: FAMILY MEDICINE
Payer: COMMERCIAL

## 2021-06-02 DIAGNOSIS — F19.10 SUBSTANCE ABUSE (H): Primary | ICD-10-CM

## 2021-06-02 PROCEDURE — 80307 DRUG TEST PRSMV CHEM ANLYZR: CPT | Performed by: FAMILY MEDICINE

## 2021-06-02 PROCEDURE — H2035 A/D TX PROGRAM, PER HOUR: HCPCS | Mod: HQ

## 2021-06-02 PROCEDURE — 80349 CANNABINOIDS NATURAL: CPT | Performed by: FAMILY MEDICINE

## 2021-06-02 PROCEDURE — 80324 DRUG SCREEN AMPHETAMINES 1/2: CPT | Performed by: FAMILY MEDICINE

## 2021-06-02 PROCEDURE — 80359 METHYLENEDIOXYAMPHETAMINES: CPT | Performed by: FAMILY MEDICINE

## 2021-06-02 PROCEDURE — 80320 DRUG SCREEN QUANTALCOHOLS: CPT | Performed by: FAMILY MEDICINE

## 2021-06-03 ENCOUNTER — HOSPITAL ENCOUNTER (OUTPATIENT)
Dept: BEHAVIORAL HEALTH | Facility: CLINIC | Age: 56
End: 2021-06-03
Attending: FAMILY MEDICINE
Payer: COMMERCIAL

## 2021-06-03 DIAGNOSIS — F19.10 SUBSTANCE ABUSE (H): Primary | ICD-10-CM

## 2021-06-03 LAB
AMPHETAMINES UR QL SCN: POSITIVE
BARBITURATES UR QL: NEGATIVE
BENZODIAZ UR QL: NEGATIVE
CANNABINOIDS UR QL SCN: POSITIVE
COCAINE UR QL: NEGATIVE
CREAT UR-MCNC: 108 MG/DL
ETHANOL UR QL SCN: NEGATIVE
OPIATES UR QL SCN: NEGATIVE
PCP UR QL SCN: NEGATIVE

## 2021-06-03 PROCEDURE — H2035 A/D TX PROGRAM, PER HOUR: HCPCS | Mod: HQ

## 2021-06-03 NOTE — GROUP NOTE
"Group Therapy Documentation    PATIENT'S NAME: Idris Woodson  MRN:   [id number]  :   1965  ACCT. NUMBER: [id number]  DATE OF SERVICE: 21  START TIME:  5:30 PM  END TIME:  7:30 PM  FACILITATOR(S): Ricardo Britton LADC  TOPIC: BEH Group Therapy  Number of patients attending the group:  7    Group Length:  2 Hours    Group Therapy Type: Addiction and Skills/Education    Summary of Group / Topics Discussed:    Weekly check in, recovery program skills put in practice to avoid relapse situations/cravings. Mental health, physical health appointments. \"Narinder to Self\" 1st half of video dealing with applying the science of safety to relapse prevention and relapse response/minimization.       Group Attendance:  Attended group session    Patient's response to the group topic/interactions:  confronted peers appropriately and cooperative with task    Patient appeared to be Attentive and Engaged.        Client specific details:  Client shared he used THC over weekend. This would be his second use episode since starting treatment. UA's have indicated abstinence (see EMR) from opioids and alcohol, but THC is still a challenge. This counselor and client met to discuss what he wants to do. He commits to attending NA now and understands if ongoing UA's indicate use, he will be referred to higher level of care whether he follows that recommendation or not. Dimension 1, 4, 5.  "

## 2021-06-04 NOTE — ADDENDUM NOTE
Encounter addended [facility]: [facility] on: 6/4/2021 5:56 PM   Actions taken: Clinical Note Signed

## 2021-06-04 NOTE — ADDENDUM NOTE
Encounter addended by: Ricardo Britton LADC on: 6/3/2021 7:11 PM   Actions taken: Charge Capture section accepted

## 2021-06-04 NOTE — GROUP NOTE
"Psychoeducation Group Documentation    PATIENT'S NAME: Idris Woodson  MRN:   [id number]  :   1965  ACCT. NUMBER: [id number]  DATE OF SERVICE: 21  START TIME:  5:30 PM  END TIME:  7:30 PM  FACILITATOR(S): Ricardo Britton LADC  TOPIC: BEH Pyschoeducation  Number of patients attending the group:  7    Group Length:  2 Hours    Skills Group Therapy Type: Recovery skills    Summary of Group / Topics Discussed:    Relapse prevention skills - Finish \"Narinder to Self\" applying science of safety to relapse prevention. Group   discussion regarding pros and cons/barriers of having a relapse prevention/relapse plan covering 10 functional  life/health/emotional areas.         Group Attendance:  Attended group session    Patient's response to the group topic/interactions:  cooperative with task    Patient appeared to be Attentive.         Client specific details:  Client shared he is feeling guilty about recent use of THC and \"letting group down\". Group and counselor discussed significance of having a support group, but internal motivation and the ability to stay abstinent using tools he has learned is also significant. Client still commits to attending sober support group. Dimension 4, 5, 6.    "

## 2021-06-04 NOTE — PROGRESS NOTES
[facility] Weekly Treatment Plan Review      ATTENDANCE for the following date span:   -     Date Monday 5/31 Tuesday 6/1 Wednesday 6/2 Thursday 6/3 Friday    Group Therapy 0 hours  0 hours 2.0 hours 2.0 hours  hours   Individual Therapy        Family Therapy        Other (Specify)            Patient did not have any absences during this time period (list absence dates and reason for absence).        Weekly Treatment Plan Review     Treatment Plan initiated on: 21.    Dimension1: Acute Intoxication/Withdrawal Potential -   Previous Dimension Ratin  Current Dimension Ratin  Date of Last Use Alcohol: from 3/8 to   THC: From 2/15 to  Opioids (pills) likely .   Any reports of withdrawal symptoms - No     Client had use episode for THC weekend of . See EMR for UA and associated THC level reports.     Dimension 2: Biomedical Conditions & Complications -   Previous Dimension Ratin  Current Dimension Ratin  Medical Concerns:  See below.   Current Medications & Medication Changes:    Past Medical History:   Diagnosis Date     ADHD      Arthritis      Depressive disorder      Hearing loss      Hypertension      Current Outpatient Medications   Medication     amLODIPine (NORVASC) 10 MG tablet     amphetamine-dextroamphetamine (ADDERALL) 20 MG tablet     citalopram (CELEXA) 40 MG tablet     lisinopril (ZESTRIL) 40 MG tablet     metoprolol succinate ER (TOPROL-XL) 25 MG 24 hr tablet     No current facility-administered medications for this encounter.      Medication Prescriber:  See EMR meds prescribed by Nima Posey MD    [address] [place] [address]    [phone] (Fax)      Taking meds as prescribed? Yes  VM  from Dr. Posey indicates he believes client is taking Adderall as prescribed and has not gotten early renewals.     Medication side effects or concerns: This counselor has attempted to determine therapeutic level of  amphetamines In UA test.  Is unable to do that via medical providers. See EMR for Amphetamine levels in UA tests.       Outside medical appointments this week (list provider and reason for visit):  Would be with [facility].     Dimension 3: Emotional/Behavioral Conditions & Complications -   Previous Dimension Ratin  Current Dimension Ratin  PHQ9     PHQ-9 SCORE 3/15/2021 2021   PHQ-9 Total Score 12 9     GAD7     AMNA-7 SCORE 3/15/2021 2021   Total Score 10 7     Mental health diagnosis: History of trauma issues due his parents getting  when he was 10 years old, due to his brother's death from an alcohol and drug overdose in  and due to be bullied for his hearing loss by peers at school when he was growing up. The patient reported having significant grief and loss issues regarding the deaths of his grandparents and his brother.     Date of last SIB:  None  Date of  last SI:  None  Date of last HI: None    Behavioral Targets:  Improve self-esteem. and Understand the relationship between addiction and mental health issues. Establish and maintain regular appointments with therapist to deal with above issues Learn how to apply self-care and psychotherapy to build a repertoire of daily habits that counteract PAWS, fatigue, depression and anxiety leading to increased resiliency and well-being.     Current MH Assignments:  List 5 ways your addiction has impacted your mental health.  and put actions into place as directed by therapist to deal with above reported issues. Report weekly on acctions taken. Attend each of the following groups, completing one topic per week, to  complete all 6 by due date, unless excused by primary counselor.  All group work to be completed each session to receive an effective outcome.     Narrative:  Client expressing willingness and effort to reestablish BEH therapy 1:1 appts. with outside   povider. Have faxed SUJATA to psych provider, but no response.      Dimension 4: Treatment Acceptance / Resistance -   Previous Dimension Ratin  Current Dimension Ratin    STACI Diagnosis:  Alcohol Use Disorder   303.90 (F10.20) Moderate In a controlled environment  304.30 (F12.20) Cannabis Use Disorder Severe  In a controlled environment  Opioid Use Disorder, Specify if:  In a controlled environment with a severity of:  304.00 (F11.20) Severe  Stage - 4  Commitment to tx process/Stage of change- Precontemplation    STACI assignments - Take action to build recovery resilience on weekly basis. Present using history, consequences of use and 5 values violated. and move up stages of change for sobriety. Increase internal motivation for sobriety. Report weekly on recovery program skills put in place to develop recovery resilience.     Narrative - Client reports ambivalence regarding commitment to abstinence from THC. Client will be doing UA test (see below) to assure abstinence. Client is acting on that ambivalence via use episodes for THC.     Dimension 5: Relapse / Continued Problem Potential -   Previous Dimension Rating:  3  Current Dimension Rating:  3  Relapses this week - YES, List See below for UA lab this week.   Urges to use - YES, List THC and alcohol cravings.   UA results -     Recent Results (from the past 168 hour(s))   Drug abuse screen 8 urine (UR)    Collection Time: 21  6:00 PM   Result Value Ref Range    Amphetamine Qual Urine Positive (A) NEG^Negative    Barbiturates Qual Urine Negative NEG^Negative    Benzodiazepine Qual Urine Negative NEG^Negative    Cannabinoids Qual Urine Positive (A) NEG^Negative    Cocaine Qual Urine Negative NEG^Negative    Ethanol Qual Urine Negative NEG^Negative    Opiates Qualitative Urine Negative NEG^Negative    PCP Qual Urine Negative NEG^Negative   Creatinine random urine    Collection Time: 21  6:00 PM   Result Value Ref Range    Creatinine Urine Random 108 mg/dL       Narrative- Client admitted recent use of  Alcohol and THC prior to first appt.   In IOP STACI. Agreement is he will be doing UA's weekly. Also agreement now that   Based on at least two use episodes for THC in IOP - next episode results in discharge  And referral to higher level of care. Unless client wants referral earlier or decides to stop  IOP STACI treatment and continue use of THC as he deems.     Dimension 6: Recovery Environment -   Previous Dimension Rating:  3  Current Dimension Ratin  Family Involvement - None  Summarize attendance at family groups and family sessions - None  Family supportive of treatment?  Yes    Community support group attendance - None    Recreational activities - Outdoor activities with minor daughter.     Narrative - Client reports rebuilding relationships with wife and minor daughter. Reports more interaction.      Justification for Continued Treatment at this Level of Care:  Early sobriety.     Discharge Planning:  Target Discharge Date/Timeframe:     Med Mgmt Provider/Appt:  See EMR   Ind therapy Provider/Appt:  See EMR for future SUJATA for provider   Family therapy Provider/Appt:  None   Other referrals:  [facility]    Has vulnerable adult status change? No    Service Coordination:  Yes with prescribing provider for adderall and attempted contact  With [facility] for recent notes.     Supervision:  None     Are Treatment Plan goals/objectives effective? NO  *If no, list changes to treatment plan: Client is now verbally committing to attending NA.    Are the current goals meeting client's needs? YEs higher probability of abstinence if client follows up   On attending sober support.     *If no, list the changes to treatment plan.    Client Input / Response: Client is expressing ambivalence about abstinence    *Client agrees with any changes to the treatment plan: Yes  *Client received copy of changes: No  *Client is aware of right to access a  treatment plan review: Yes

## 2021-06-06 LAB
AMPHET UR CFM-MCNC: 2237 NG/ML
AMPHET UR QL CFM: NORMAL
ETHYL GLUCURONIDE UR QL: NEGATIVE

## 2021-06-07 ENCOUNTER — HOSPITAL ENCOUNTER (OUTPATIENT)
Dept: BEHAVIORAL HEALTH | Facility: CLINIC | Age: 56
End: 2021-06-07
Attending: FAMILY MEDICINE
Payer: COMMERCIAL

## 2021-06-07 DIAGNOSIS — F19.10 SUBSTANCE ABUSE (H): Primary | ICD-10-CM

## 2021-06-07 PROCEDURE — 80307 DRUG TEST PRSMV CHEM ANLYZR: CPT | Performed by: FAMILY MEDICINE

## 2021-06-07 PROCEDURE — 80359 METHYLENEDIOXYAMPHETAMINES: CPT | Performed by: FAMILY MEDICINE

## 2021-06-07 PROCEDURE — 80349 CANNABINOIDS NATURAL: CPT | Performed by: FAMILY MEDICINE

## 2021-06-07 PROCEDURE — 80320 DRUG SCREEN QUANTALCOHOLS: CPT | Performed by: FAMILY MEDICINE

## 2021-06-07 PROCEDURE — H2035 A/D TX PROGRAM, PER HOUR: HCPCS | Mod: HQ

## 2021-06-07 PROCEDURE — 80324 DRUG SCREEN AMPHETAMINES 1/2: CPT | Performed by: FAMILY MEDICINE

## 2021-06-08 NOTE — ADDENDUM NOTE
Encounter addended by: Ricardo Britton, [facility] on: 6/8/2021 4:30 PM   Actions taken: Order list changed, Multistep and multistep collection tasks completed

## 2021-06-08 NOTE — GROUP NOTE
"Group Therapy Documentation    PATIENT'S NAME: Idris Woodson  MRN:   [id number]  :   1965  ACCT. NUMBER: [id number]  DATE OF SERVICE: 21  START TIME:  5:30 PM  END TIME:  7:30 PM  FACILITATOR(S): Ricardo Britton LADC  TOPIC: BEH Group Therapy  Number of patients attending the group:  5    Group Length:  2 Hours    Group Therapy Type: Skills/Education    Summary of Group / Topics Discussed:    Relapse Prevention/relapse planning from a \"threat to safety\" perspective. Covers  Recovery/Sobriety management plans:   STACI Treatment, Therapy, Housing, Sober Support Groups, Relapse response, Testing, Goals, ASAM doctor, Medications,  Fun activities.           Group Attendance:  Attended group session    Patient's response to the group topic/interactions:  confronted peers appropriately and discussed personal experience with topic    Patient appeared to be Attentive and Engaged.        Client specific details:  Client shared that he talked about his use episode over last weekend for THC and returned to STACI treatment immediately vs. Trying to \"hide\" his use. Client did not attend sober support however over the weekend as per agreement with counselor last week and this counselor informed client it is not an option for him to begin rebuilding recovery resilience. Dimension 1, 4, 5, 6.  "

## 2021-06-09 ENCOUNTER — HOSPITAL ENCOUNTER (OUTPATIENT)
Dept: BEHAVIORAL HEALTH | Facility: CLINIC | Age: 56
End: 2021-06-09
Attending: FAMILY MEDICINE
Payer: COMMERCIAL

## 2021-06-09 DIAGNOSIS — F19.10 SUBSTANCE ABUSE (H): Primary | ICD-10-CM

## 2021-06-09 LAB
AMPHETAMINES UR QL SCN: POSITIVE
BARBITURATES UR QL: NEGATIVE
BENZODIAZ UR QL: NEGATIVE
CANNABINOIDS UR CFM-MCNC: 167 NG/ML
CANNABINOIDS UR QL SCN: POSITIVE
CARBOXYTHC/CREAT UR: 155 NG/MG{CREAT}
COCAINE UR QL: NEGATIVE
CREAT UR-MCNC: 101 MG/DL
ETHANOL UR QL SCN: NEGATIVE
OPIATES UR QL SCN: NEGATIVE
PCP UR QL SCN: NEGATIVE

## 2021-06-09 PROCEDURE — H2035 A/D TX PROGRAM, PER HOUR: HCPCS | Mod: HQ

## 2021-06-10 ENCOUNTER — HOSPITAL ENCOUNTER (OUTPATIENT)
Dept: BEHAVIORAL HEALTH | Facility: CLINIC | Age: 56
End: 2021-06-10
Attending: FAMILY MEDICINE
Payer: COMMERCIAL

## 2021-06-10 DIAGNOSIS — F19.10 SUBSTANCE ABUSE (H): Primary | ICD-10-CM

## 2021-06-10 PROCEDURE — H2035 A/D TX PROGRAM, PER HOUR: HCPCS | Mod: HQ

## 2021-06-10 NOTE — GROUP NOTE
"Group Therapy Documentation    PATIENT'S NAME: Idris Woodson  MRN:   [id number]  :   1965  ACCT. NUMBER: [id number]  DATE OF SERVICE: 21  START TIME:  5:30 PM  END TIME:  7:30 PM  FACILITATOR(S): Ricardo Britton LADC  TOPIC: BEH Group Therapy  Number of patients attending the group:  6    Group Length:  2 Hours    Group Therapy Type: Addiction    Summary of Group / Topics Discussed:    Coping Skills/Lifestyle Managemet, Choices in Recovery, Symptom Management, and symptoms of intoxication vs. Acute withdrawal vs. Post acute withdrawal vs. Co-occurring behavioral health disorder symptoms. Begin overview and discussion regarding six step relapse cycle. How to get in, how to get out.           Group Attendance:  Attended group session    Patient's response to the group topic/interactions:  confronted peers appropriately and expressed readiness to alter behaviors    Patient appeared to be Attentive and Engaged.        Client specific details:  Client shared past experience with withdrawal and associated cravings for opioids. Client \"pushed back\" on cravings to get high vs. Cravings to stop withdrawal symptoms. Lead to discussion regarding prolonged withdrawal from opioids and the importance of an anti craving medication early on in sobriety. Dimension 3, 5.   "

## 2021-06-11 LAB
CANNABINOIDS UR CFM-MCNC: 53 NG/ML
CARBOXYTHC/CREAT UR: 52 NG/MG{CREAT}

## 2021-06-11 NOTE — PROGRESS NOTES
[facility] Weekly Treatment Plan Review      ATTENDANCE for the following date span:   -     Date Monday 6/7 Tuesday 6/8 Wednesday 6/9 Thursday 6/10 Friday    Group Therapy 2 hours  0 hours 2.0 hours 2.0 hours  hours   Individual Therapy        Family Therapy        Other (Specify)          Patient did not have any absences during this time period (list absence dates and reason for absence).      Weekly Treatment Plan Review     Treatment Plan initiated on: 21.    Dimension1: Acute Intoxication/Withdrawal Potential -   Previous Dimension Ratin  Current Dimension Ratin  Date of Last Use Alcohol: from 3/8 to   THC: From 2/15 to  Opioids (pills) likely .   Any reports of withdrawal symptoms - No     Client had use episode for THC weekend of . See EMR for UA and associated THC level reports.     Dimension 2: Biomedical Conditions & Complications -   Previous Dimension Ratin  Current Dimension Ratin  Medical Concerns:  See below.   Current Medications & Medication Changes:    Past Medical History:   Diagnosis Date     ADHD      Arthritis      Depressive disorder      Hearing loss      Hypertension      Current Outpatient Medications   Medication     amLODIPine (NORVASC) 10 MG tablet     amphetamine-dextroamphetamine (ADDERALL) 20 MG tablet     citalopram (CELEXA) 40 MG tablet     lisinopril (ZESTRIL) 40 MG tablet     metoprolol succinate ER (TOPROL-XL) 25 MG 24 hr tablet     No current facility-administered medications for this encounter.      Medication Prescriber:  See EMR meds prescribed by Nima Posey MD    [address] [place] [address]    [phone] (Fax)      Taking meds as prescribed? Yes  VM  from Dr. Posey indicates he believes client is taking Adderall as prescribed and has not gotten early renewals.     Medication side effects or concerns: This counselor has attempted to determine therapeutic level of  amphetamines In UA test.  Is unable to do that via medical providers. See EMR for Amphetamine levels in UA tests.       Outside medical appointments this week (list provider and reason for visit):  Would be with [facility].     Dimension 3: Emotional/Behavioral Conditions & Complications -   Previous Dimension Ratin  Current Dimension Ratin  PHQ9     PHQ-9 SCORE 3/15/2021 2021   PHQ-9 Total Score 12 9     GAD7     AMNA-7 SCORE 3/15/2021 2021   Total Score 10 7     Mental health diagnosis: History of trauma issues due his parents getting  when he was 10 years old, due to his brother's death from an alcohol and drug overdose in  and due to be bullied for his hearing loss by peers at school when he was growing up. The patient reported having significant grief and loss issues regarding the deaths of his grandparents and his brother.     Date of last SIB:  None  Date of  last SI:  None  Date of last HI: None    Behavioral Targets:  Improve self-esteem. and Understand the relationship between addiction and mental health issues. Establish and maintain regular appointments with therapist to deal with above issues Learn how to apply self-care and psychotherapy to build a repertoire of daily habits that counteract PAWS, fatigue, depression and anxiety leading to increased resiliency and well-being.     Current MH Assignments:  List 5 ways your addiction has impacted your mental health.  and put actions into place as directed by therapist to deal with above reported issues. Report weekly on acctions taken. Attend each of the following groups, completing one topic per week, to  complete all 6 by due date, unless excused by primary counselor.  All group work to be completed each session to receive an effective outcome.     Narrative:  Client expressing willingness and effort to reestablish BEH therapy 1:1 appts. with outside   povider. Have faxed SUJATA to psych provider, but no response.      Dimension 4: Treatment Acceptance / Resistance -   Previous Dimension Ratin  Current Dimension Ratin    STACI Diagnosis:  Alcohol Use Disorder   303.90 (F10.20) Moderate In a controlled environment  304.30 (F12.20) Cannabis Use Disorder Severe  In a controlled environment  Opioid Use Disorder, Specify if:  In a controlled environment with a severity of:  304.00 (F11.20) Severe  Stage - 4  Commitment to tx process/Stage of change- Contemplation/preparation    STACI assignments - Take action to build recovery resilience on weekly basis. Present using history, consequences of use and 5 values violated. and move up stages of change for sobriety. Increase internal motivation for sobriety. Report weekly on recovery program skills put in place to develop recovery resilience.     Narrative - Client reports ambivalence regarding commitment to abstinence from THC. Client will be doing UA test (see below) to assure abstinence. Client is acting on that ambivalence via use episodes for THC.     Dimension 5: Relapse / Continued Problem Potential -   Previous Dimension Rating:  3  Current Dimension Rating:  3  Relapses this week - YES, List See below for UA lab this week.   Urges to use - YES, List THC and alcohol cravings.   UA results -     Recent Results (from the past 168 hour(s))   Drug abuse screen 8 urine (UR)    Collection Time: 21  7:00 PM   Result Value Ref Range    Amphetamine Qual Urine Positive (A) NEG^Negative    Barbiturates Qual Urine Negative NEG^Negative    Benzodiazepine Qual Urine Negative NEG^Negative    Cannabinoids Qual Urine Positive (A) NEG^Negative    Cocaine Qual Urine Negative NEG^Negative    Ethanol Qual Urine Negative NEG^Negative    Opiates Qualitative Urine Negative NEG^Negative    PCP Qual Urine Negative NEG^Negative   THC Confirmation Quantitative Urine    Collection Time: 21  7:00 PM   Result Value Ref Range    THC Metabolite 53 ng/mL    THC/Creatinine Ratio 52 ng/mg[creat]    Creatinine random urine    Collection Time: 21  7:00 PM   Result Value Ref Range    Creatinine Urine Random 101 mg/dL       Narrative- Client admitted recent use of Alcohol and THC prior to first appt.   In IOP STACI. Agreement is he will be doing UA's weekly. Also agreement now that   based on at least two use episodes for THC in IOP - next episode results in discharge  and referral to higher level of care. Unless client wants referral earlier or decides to stop  IOP STACI treatment and continue use of THC as he deems.     Dimension 6: Recovery Environment -   Previous Dimension Rating:  3  Current Dimension Ratin  Family Involvement - None  Summarize attendance at family groups and family sessions - None  Family supportive of treatment?  Yes    Community support group attendance - None    Recreational activities - Outdoor activities with minor daughter. Reports golfing.     Narrative - Client reports rebuilding relationships with wife and minor daughter. Reports more interaction.      Justification for Continued Treatment at this Level of Care:  Early sobriety.     Discharge Planning:  Target Discharge Date/Timeframe:  End    Med Mgmt Provider/Appt:  See EMR   Ind therapy Provider/Appt:  See EMR for future SUJATA for provider   Family therapy Provider/Appt:  None   Other referrals:  [facility]    Has vulnerable adult status change? No    Service Coordination:  Yes with prescribing provider for adderall and attempted contact  With [facility] for recent notes.     Supervision:  None     Are Treatment Plan goals/objectives effective? NO  *If no, list changes to treatment plan: Client is now verbally committing to attending NA.    Are the current goals meeting client's needs? Yes higher probability of abstinence if client follows up   On attending sober support.     *If no, list the changes to treatment plan.    Client Input / Response:  Client is expressing ambivalence about abstinence    *Client agrees with any changes to the treatment plan: Yes  *Client received copy of changes: No  *Client is aware of right to access a treatment plan review: Yes

## 2021-06-11 NOTE — GROUP NOTE
"Group Therapy Documentation    PATIENT'S NAME: Idris Woodson  MRN:   [id number]  :   1965  ACCT. NUMBER: [id number]  DATE OF SERVICE: 6/10/21  START TIME:  5:30 PM  END TIME:  7:30 PM  FACILITATOR(S): Ricardo Britton LADC  TOPIC: BEH Group Therapy  Number of patients attending the group:  5    Group Length:  2 Hours    Group Therapy Type: Addiction and Skills/Education    Summary of Group / Topics Discussed:    Six step relapse cycle, how to get in, how to get out. Group discussion regarding what action steps against relapse are effective at which point in the cycle.       Group Attendance:  Attended group session    Patient's response to the group topic/interactions:  cooperative with task and discussed personal experience with topic    Patient appeared to be Attentive and Engaged.        Client specific details:  Client shared that a trigger situation for him includes \"being proud for not  using opioids\", then seeking and smoking THC.Client also said he has cravings when he is unable to get to sleep and he used THC for that in the past. Group discussed using sleep hygiene actions such as breathing, no caffeine, no TV for several hours before attempting sleep. Dimension 3, 4, 5.  "

## 2021-06-11 NOTE — ADDENDUM NOTE
Encounter addended by: Ricardo Britton LADC on: 6/11/2021 5:47 PM   Actions taken: Clinical Note Signed

## 2021-06-14 ENCOUNTER — HOSPITAL ENCOUNTER (OUTPATIENT)
Dept: BEHAVIORAL HEALTH | Facility: CLINIC | Age: 56
End: 2021-06-14
Attending: FAMILY MEDICINE
Payer: COMMERCIAL

## 2021-06-14 DIAGNOSIS — F19.10 SUBSTANCE ABUSE (H): Primary | ICD-10-CM

## 2021-06-14 PROCEDURE — H2035 A/D TX PROGRAM, PER HOUR: HCPCS | Mod: HQ

## 2021-06-14 PROCEDURE — 80307 DRUG TEST PRSMV CHEM ANLYZR: CPT | Performed by: FAMILY MEDICINE

## 2021-06-14 PROCEDURE — 80349 CANNABINOIDS NATURAL: CPT | Performed by: FAMILY MEDICINE

## 2021-06-14 PROCEDURE — 80320 DRUG SCREEN QUANTALCOHOLS: CPT | Performed by: FAMILY MEDICINE

## 2021-06-15 LAB
AMPHETAMINES UR QL SCN: POSITIVE
BARBITURATES UR QL: NEGATIVE
BENZODIAZ UR QL: NEGATIVE
CANNABINOIDS UR QL SCN: POSITIVE
COCAINE UR QL: NEGATIVE
CREAT UR-MCNC: 114 MG/DL
ETHANOL UR QL SCN: NEGATIVE
OPIATES UR QL SCN: NEGATIVE
PCP UR QL SCN: NEGATIVE

## 2021-06-15 NOTE — GROUP NOTE
Group Therapy Documentation    PATIENT'S NAME: Idris Woodson  MRN:   [id number]  :   1965  ACCT. NUMBER: [id number]  DATE OF SERVICE: 21  START TIME:  5:30 PM  END TIME:  7:30 PM  FACILITATOR(S): Ricardo Britton LADC  TOPIC: BEH Group Therapy  Number of patients attending the group:  7    Group Length:  2 Hours    Group Therapy Type: Addiction and Skills/Education    Summary of Group / Topics Discussed:    Psychoeducation/Skills Relapse Prevention (STACI)  This topic will give a general overview of basic relapse prevention, including definitions of warning signs, triggers and cravings and the importance of addressing healthy coping skills for ongoing relapse prevention.    Objective(s):    Patients will identify 4 key warning signs and triggers    Patients will identify 4 healthy coping mechanisms         Structure (modalities, homework, worksheets, etc.):    Provide psychoeducation on relapse prevention and healthy coping methods.    Facilitate group discussion around each patient s current awareness of warning signs,  triggers and cravings.     Expected therapeutic outcome(s):    Patient will:    Be able to manage triggers and cravings without returning to substance use.      Therapeutic outcomes measured by:    Patients will name 4 of their warning signs and triggers    Patients will name 4 healthy coping mechanisms for dealing with their warning signs and triggers      Group Attendance:  Attended group session    Patient's response to the group topic/interactions:  cooperative with task and discussed personal experience with topic    Patient appeared to be Actively participating and Engaged.        Client specific details:  Client shared he is triggered by boredom and being unaccountable to family members (I.e. alone over a weekend). Client shared he attended his first two NA meetings last weekend at [facility], which he really liked and felt helped him maintain accountability and reduced his  cravings for THC. Dimension 4, 5, 6.

## 2021-06-16 LAB
AMPHET UR CFM-MCNC: NORMAL NG/ML
AMPHET UR QL CFM: NORMAL
ETHYL GLUCURONIDE UR QL: NEGATIVE
ETHYL GLUCURONIDE UR QL: NEGATIVE

## 2021-06-17 ENCOUNTER — HOSPITAL ENCOUNTER (OUTPATIENT)
Dept: BEHAVIORAL HEALTH | Facility: CLINIC | Age: 56
End: 2021-06-17
Attending: FAMILY MEDICINE
Payer: COMMERCIAL

## 2021-06-17 DIAGNOSIS — F19.10 SUBSTANCE ABUSE (H): Primary | ICD-10-CM

## 2021-06-17 PROCEDURE — H2035 A/D TX PROGRAM, PER HOUR: HCPCS | Mod: HQ

## 2021-06-17 NOTE — TELEPHONE ENCOUNTER
----- Message from JESUS Alcaraz sent at 6/16/2021  6:48 PM CDT -----  Regarding: Add appointments for client at [facility]  Patient Name:  See above  Location of programming: [facility]  Start Date: 6/17/21  Group: ([id number] M, W, TH 5:30PM  Provider: JESUS Prasad  Number of visits to be scheduled:12   Length/Duration of Appointment in minutes: 120  Visit Type (VIDEO/TELEPHONE/IN-PERSON): In person  Additional notes:  Thanks  JESUS Prasad

## 2021-06-18 LAB
CANNABINOIDS UR CFM-MCNC: 36 NG/ML
CARBOXYTHC/CREAT UR: 32 NG/MG{CREAT}

## 2021-06-18 NOTE — GROUP NOTE
"Group Therapy Documentation    PATIENT'S NAME: Idris Woodson  MRN:   [id number]  :   1965  ACCT. NUMBER: [id number]  DATE OF SERVICE: 21  START TIME:  5:30 PM  END TIME:  7:30 PM  FACILITATOR(S): Ricardo Britton LADC  TOPIC: BEH Group Therapy  Number of patients attending the group:  7    Group Length:  2 Hours    Group Therapy Type: Addiction    Summary of Group / Topics Discussed:    Symptom Management and \"What to Expect in Early Recovery\" handout/discussion from [employer] book \"Staying Sober\". Biological, psychological, sociological components of early sobriety - first 90 days or so.       Group Attendance:  Attended group session    Patient's response to the group topic/interactions:  confronted peers appropriately and discussed personal experience with topic    Patient appeared to be Attentive and Engaged.        Client specific details:  Client shared he has to pay particular attention to feeling hungry or angry in early recovery. Notices feeling like he wants to smoke marijuana under those situations. Discussed using that self awareness to motivate himself to walk away from anger provoking situations and make sure he is properly taking care of nutrition needs. Dimension 3, 4,5.  "

## 2021-06-19 LAB
AMPHET UR CFM-MCNC: NORMAL NG/ML
AMPHET UR QL CFM: NORMAL
ETHYL GLUCURONIDE UR QL: NEGATIVE

## 2021-06-23 ENCOUNTER — HOSPITAL ENCOUNTER (OUTPATIENT)
Dept: BEHAVIORAL HEALTH | Facility: CLINIC | Age: 56
End: 2021-06-23
Attending: FAMILY MEDICINE
Payer: COMMERCIAL

## 2021-06-23 DIAGNOSIS — F19.10 SUBSTANCE ABUSE (H): Primary | ICD-10-CM

## 2021-06-23 PROCEDURE — H2035 A/D TX PROGRAM, PER HOUR: HCPCS | Mod: HQ

## 2021-06-24 NOTE — GROUP NOTE
"Group Therapy Documentation    PATIENT'S NAME: Idris Woodson  MRN:   [id number]  :   1965  ACCT. NUMBER: [id number]  DATE OF SERVICE: 21  START TIME:  5:30 PM  END TIME:  7:30 PM  FACILITATOR(S): Ricardo Britton LADC  TOPIC: BEH Group Therapy  Number of patients attending the group:  6    Group Length:  2 Hours    Group Therapy Type: Skills/Education    Summary of Group / Topics Discussed:    Recovery Program Skills/actions to be taken to build and maintain Recovery Resilience - Biological/Psychological/Sociological actions taken on regular basis in sobriety.       Group Attendance:  Attended group session    Patient's response to the group topic/interactions:  cooperative with task and discussed personal experience with topic    Patient appeared to be Attentive and Engaged.        Client specific details:  Client shared recent experience with attending multiple AA/NA meetings at [employer], something he refused to do until several weeks ago and noticeable lack of cravings and \"thoughts of using drugs\" vs. When he started IOP STACI program. Dimension 3, 4, 5..  "

## 2021-06-25 NOTE — TELEPHONE ENCOUNTER
----- Message from JESUS Alcaraz sent at 6/24/2021  4:11 PM CDT -----  Regarding: Move client from [place] to [facility] Phase II group week of June 28 only.  For the week of June 28 only - please arrange to move the above client from [facility] Phase II ([id number]) to [facility] Phase II (video [id number]) group ([id number]).     Thanks     JESUS Prasad

## 2021-06-25 NOTE — ADDENDUM NOTE
Encounter addended by: Ricardo [facility]on [facility] on: 6/24/2021 8:32 PM   Actions taken: Clinical Note Signed

## 2021-06-25 NOTE — PROGRESS NOTES
[facility] Weekly Treatment Plan Review      ATTENDANCE for the following date span:   -     Date     Group Therapy 2 hours  0 hours 2.0 hours 0 hours  hours   Individual Therapy        Family Therapy        Other (Specify)          Patient did not have any absences during this time period (list absence dates and reason for absence).    Client was no call, no show for appointment .     Weekly Treatment Plan Review     Treatment Plan initiated on: 21.    Dimension1: Acute Intoxication/Withdrawal Potential -   Previous Dimension Ratin  Current Dimension Ratin  Date of Last Use Alcohol: from 3/8 to   THC: From 2/15 to  Opioids (pills) likely .   Any reports of withdrawal symptoms - No     Client had use episode for THC weekend of . See EMR for UA and associated THC level reports.     Dimension 2: Biomedical Conditions & Complications -   Previous Dimension Ratin  Current Dimension Ratin  Medical Concerns:  See below.   Current Medications & Medication Changes:    Past Medical History:   Diagnosis Date     ADHD      Arthritis      Depressive disorder      Hearing loss      Hypertension      Current Outpatient Medications   Medication     amLODIPine (NORVASC) 10 MG tablet     amphetamine-dextroamphetamine (ADDERALL) 20 MG tablet     citalopram (CELEXA) 40 MG tablet     lisinopril (ZESTRIL) 40 MG tablet     metoprolol succinate ER (TOPROL-XL) 25 MG 24 hr tablet     No current facility-administered medications for this encounter.      Medication Prescriber:  See EMR meds prescribed by Nima Posey MD    [address] [place] [address]    [phone] (Fax)      Taking meds as prescribed? Yes  VM  from Dr. Posey indicates he believes client is taking Adderall as prescribed and has not gotten early renewals.     Medication side effects or concerns: This  counselor has attempted to determine therapeutic level of amphetamines In UA test.  Is unable to do that via medical providers. See EMR for Amphetamine levels in UA tests.       Outside medical appointments this week (list provider and reason for visit):  Would be with [facility].     Dimension 3: Emotional/Behavioral Conditions & Complications -   Previous Dimension Ratin  Current Dimension Ratin  PHQ9     PHQ-9 SCORE 3/15/2021 2021   PHQ-9 Total Score 12 9     GAD7     AMNA-7 SCORE 3/15/2021 2021   Total Score 10 7     Mental health diagnosis: History of trauma issues due his parents getting  when he was 10 years old, due to his brother's death from an alcohol and drug overdose in  and due to be bullied for his hearing loss by peers at school when he was growing up. The patient reported having significant grief and loss issues regarding the deaths of his grandparents and his brother.     Date of last SIB:  None  Date of  last SI:  None  Date of last HI: None    Behavioral Targets:  Improve self-esteem. and Understand the relationship between addiction and mental health issues. Establish and maintain regular appointments with therapist to deal with above issues Learn how to apply self-care and psychotherapy to build a repertoire of daily habits that counteract PAWS, fatigue, depression and anxiety leading to increased resiliency and well-being.     Current MH Assignments:  List 5 ways your addiction has impacted your mental health.  and put actions into place as directed by therapist to deal with above reported issues. Report weekly on acctions taken. Attend each of the following groups, completing one topic per week, to  complete all 6 by due date, unless excused by primary counselor.  All group work to be completed each session to receive an effective outcome.     Narrative:  Client expressing willingness and effort to reestablish BEH therapy 1:1 appts. with outside   povider.  Have faxed SUJATA to psych provider, but no response.     Dimension 4: Treatment Acceptance / Resistance -   Previous Dimension Ratin  Current Dimension Ratin    STACI Diagnosis:  Alcohol Use Disorder   303.90 (F10.20) Moderate In a controlled environment  304.30 (F12.20) Cannabis Use Disorder Severe  In a controlled environment  Opioid Use Disorder, Specify if:  In a controlled environment with a severity of:  304.00 (F11.20) Severe  Stage - 4  Commitment to tx process/Stage of change- Contemplation/preparation    STACI assignments - Take action to build recovery resilience on weekly basis. Present using history, consequences of use and 5 values violated. and move up stages of change for sobriety. Increase internal motivation for sobriety. Report weekly on recovery program skills put in place to develop recovery resilience.     Narrative - Client reports ambivalence regarding commitment to abstinence from THC. Client will be doing UA test (see below) to assure abstinence. Client is acting on that ambivalence via use episodes for THC.     Dimension 5: Relapse / Continued Problem Potential -   Previous Dimension Rating:  3  Current Dimension Rating:  3  Relapses this week - YES, List See below for UA lab this week.   Urges to use - YES, List THC and alcohol cravings.   UA results -     No results found for this or any previous visit (from the past 168 hour(s)).    Narrative- Client admitted recent use of Alcohol and THC prior to first appt.   In IOP [facility]. Agreement is he will be doing UA's weekly. Also agreement now that   based on at least two use episodes for THC in IOP - next episode results in discharge  and referral to higher level of care. Unless client wants referral earlier or decides to stop  IOP STACI treatment and continue use of THC as he deems.     Dimension 6: Recovery Environment -   Previous Dimension Rating:  3  Current Dimension Ratin  Family Involvement - None  Summarize attendance at family  groups and family sessions - None  Family supportive of treatment?  Yes    Community support group attendance - Yes client has reported attended several NA meetings at [employer].     Recreational activities - Outdoor activities with minor daughter. Reports golfing.     Narrative - Client reports rebuilding relationships with wife and minor daughter. Reports more interaction.  Reports   Intent to return to NA and reports being happy with his first experiences with sober support.     Justification for Continued Treatment at this Level of Care:  Early sobriety.     Discharge Planning:  Target Discharge Date/Timeframe:  End of September   Med Mgmt Provider/Appt:  See EMR   Ind therapy Provider/Appt:  See EMR for future SUJATA for provider   Family therapy Provider/Appt:  None   Other referrals:  [facility]    Has vulnerable adult status change? No    Service Coordination:  Yes with prescribing provider for adderall and attempted contact  With [facility] for recent notes.     Supervision:  None     Are Treatment Plan goals/objectives effective? NO  *If no, list changes to treatment plan: Client is now verbally committing to attending NA.    Are the current goals meeting client's needs? Yes higher probability of abstinence if client follows up   On attending sober support.     *If no, list the changes to treatment plan.    Client Input / Response: Client is expressing ambivalence about abstinence from THC in particular.     *Client agrees with any changes to the treatment plan: Yes  *Client received copy of changes: No  *Client is aware of right to access a treatment plan review: Yes

## 2021-06-29 NOTE — TELEPHONE ENCOUNTER
----- Message from Miguel Angel Nye [facility] sent at 6/28/2021  8:11 PM CDT -----  Regarding: add to my schedule  Scheduling Request    Patient Name: Idris Woodson  Location of programming: [facility] Phase 2 Mixed A   Start Date: June /29/2021  Group: [id number] ON MON, TUES, WED 5:30 PM to 7:30 PM  Attending Provider (MD): Mary Ellen  Number of visits to be scheduled: 1 session  Duration of Appointment in minutes: 120  Visit Type: Zoom - 2655    Additional notes: this is one of Ricardo Britton's clients added to my group this week.  He does not think he can attend Wed 6/30 and is already added to my Thurs Ph3 schedule, but leave him on Wed in case he can make it.  I will cancel whichever he can't.    Thanks,  Miguel Angel

## 2021-07-05 ENCOUNTER — HOSPITAL ENCOUNTER (OUTPATIENT)
Dept: BEHAVIORAL HEALTH | Facility: CLINIC | Age: 56
End: 2021-07-05
Attending: FAMILY MEDICINE
Payer: COMMERCIAL

## 2021-07-05 DIAGNOSIS — F19.10 SUBSTANCE ABUSE (H): Primary | ICD-10-CM

## 2021-07-05 PROCEDURE — H2035 A/D TX PROGRAM, PER HOUR: HCPCS | Mod: HQ

## 2021-07-05 PROCEDURE — 80307 DRUG TEST PRSMV CHEM ANLYZR: CPT | Performed by: FAMILY MEDICINE

## 2021-07-05 PROCEDURE — 80320 DRUG SCREEN QUANTALCOHOLS: CPT | Performed by: FAMILY MEDICINE

## 2021-07-05 PROCEDURE — 80349 CANNABINOIDS NATURAL: CPT | Performed by: FAMILY MEDICINE

## 2021-07-05 NOTE — TELEPHONE ENCOUNTER
Left VM with client about no call no shows for all of last week. Acknowledged we moved clients around between [facility] and [facility] due to this counselor being on vacation, but that I still expected him to show up on video. Asked for call back immediately to discuss his attendance plan going forward.     Ricardo Britton, [facility]

## 2021-07-06 LAB
AMPHETAMINES UR QL SCN: POSITIVE
BARBITURATES UR QL: NEGATIVE
BENZODIAZ UR QL: NEGATIVE
CANNABINOIDS UR QL SCN: POSITIVE
COCAINE UR QL: NEGATIVE
CREAT UR-MCNC: 78 MG/DL
ETHANOL UR QL SCN: NEGATIVE
OPIATES UR QL SCN: NEGATIVE
PCP UR QL SCN: NEGATIVE

## 2021-07-06 NOTE — GROUP NOTE
Group Therapy Documentation    PATIENT'S NAME: Idris Woodson  MRN:   [id number]  :   1965  ACCT. NUMBER: [id number]  DATE OF SERVICE: 21  START TIME:  5:30 PM  END TIME:  7:30 PM  FACILITATOR(S): Ricardo Britton LADC  TOPIC: BEH Group Therapy  Number of patients attending the group:  3    Group Length:  2 Hours    Group Therapy Type: Addiction and Medical Aspects of Substance Use Disorder    Summary of Group / Topics Discussed:    Medical Aspects of STACI and effects of repeated abuse of various categories of Mood Altering Chemicals on the body and brain.       Group Attendance:  Attended group session    Patient's response to the group topic/interactions:  confronted peers appropriately and cooperative with task    Patient appeared to be Attentive and Engaged.        Client specific details:  Client shared he has pain issues with hip and knee but has not taken opioids and knows the next step will be surgery/replacement which he wants to put off. Client shared recent labs (see EMR) from [facility] provider showing some areas of concern for which he is working with his PCP. Client has fallen away from attending NA at [facility] leading to discussion regarding what it would take for him to return. Dimension 2, 4, 5, 6.

## 2021-07-07 ENCOUNTER — HOSPITAL ENCOUNTER (OUTPATIENT)
Dept: BEHAVIORAL HEALTH | Facility: CLINIC | Age: 56
End: 2021-07-07
Attending: FAMILY MEDICINE
Payer: COMMERCIAL

## 2021-07-07 DIAGNOSIS — F19.10 SUBSTANCE ABUSE (H): Primary | ICD-10-CM

## 2021-07-07 LAB — ETHYL GLUCURONIDE UR QL: NEGATIVE

## 2021-07-07 PROCEDURE — H2035 A/D TX PROGRAM, PER HOUR: HCPCS | Mod: HQ

## 2021-07-08 LAB
CANNABINOIDS UR CFM-MCNC: 350 NG/ML
CARBOXYTHC/CREAT UR: 449 NG/MG{CREAT}

## 2021-07-08 NOTE — GROUP NOTE
Group Therapy Documentation    PATIENT'S NAME: Idris Woodson  MRN:   [id number]  :   1965  ACCT. NUMBER: [id number]  DATE OF SERVICE: 21  START TIME:  5:30 PM  END TIME:  7:30 PM  FACILITATOR(S): Ricardo Britton LADC  TOPIC: BEH Group Therapy  Number of patients attending the group:  6    Group Length:  2 Hours    Group Therapy Type: Addiction and medical effects (Bio/Pyscho) of Substance Use disorder video and group discussion.     Summary of Group / Topics Discussed:    Effects of STACI on the body and brain of patients in active addiction. Group discussion and examples from personal experience regarding similarities and differences from client history.       Group Attendance:  Attended group session    Patient's response to the group topic/interactions:  cooperative with task, discussed personal experience with topic and expressed reluctance to alter behavior    Patient appeared to be Attentive and Engaged.        Client specific details:  Client and counselor spoke privately about recent THC test coming back positive and this counselors belief that client continues to use THC. Client denies smoking, but acknowledges OTC use of CBD product with THC in the ingredients. Counselor asked client to bring in products so I can scan and send ingredients to VALERIE MARTINES for his opinion and recommendations. Dimension 1, 4, 5. .

## 2021-07-09 NOTE — PROGRESS NOTES
[facility] Weekly Treatment Plan Review      ATTENDANCE for the following date span:  -     Date     Group Therapy 2 hours  0 hours 2.0 hours 0 hours  hours   Individual Therapy        Family Therapy        Other (Specify)          Patient did have any absences during this time period (list absence dates and reason for absence).    Excused absence for family     Weekly Treatment Plan Review     Treatment Plan initiated on: 21.    Dimension1: Acute Intoxication/Withdrawal Potential -   Previous Dimension Ratin  Current Dimension Ratin  Date of Last Use Alcohol: from 3/8 to   THC: From 2/15 to  Opioids (pills) likely .   THC use reported week of .     Any reports of withdrawal symptoms - No     Client had use episode for THC week of . See EMR for UA and associated THC level reports.     Dimension 2: Biomedical Conditions & Complications -   Previous Dimension Ratin  Current Dimension Ratin  Medical Concerns:  See below.   Current Medications & Medication Changes:    Past Medical History:   Diagnosis Date     ADHD      Arthritis      Depressive disorder      Hearing loss      Hypertension      Current Outpatient Medications   Medication     amLODIPine (NORVASC) 10 MG tablet     amphetamine-dextroamphetamine (ADDERALL) 20 MG tablet     citalopram (CELEXA) 40 MG tablet     lisinopril (ZESTRIL) 40 MG tablet     metoprolol succinate ER (TOPROL-XL) 25 MG 24 hr tablet     No current facility-administered medications for this encounter.      Medication Prescriber:  See EMR meds prescribed by Nima Posey MD    [address] [place] [address]    [phone] (Fax)      Taking meds as prescribed? Yes  VM  from Dr. Posey indicates he believes client is taking Adderall as prescribed and has not gotten early renewals.     Medication side effects or concerns: This  counselor has attempted to determine therapeutic level of amphetamines In UA test.  Is unable to do that via medical providers. See EMR for Amphetamine levels in UA tests.       Outside medical appointments this week (list provider and reason for visit):  Would be with [facility].     Dimension 3: Emotional/Behavioral Conditions & Complications -   Previous Dimension Ratin  Current Dimension Ratin  PHQ9     PHQ-9 SCORE 3/15/2021 2021   PHQ-9 Total Score 12 9     GAD7     AMNA-7 SCORE 3/15/2021 2021   Total Score 10 7     Mental health diagnosis: History of trauma issues due his parents getting  when he was 10 years old, due to his brother's death from an alcohol and drug overdose in  and due to be bullied for his hearing loss by peers at school when he was growing up. The patient reported having significant grief and loss issues regarding the deaths of his grandparents and his brother.     Date of last SIB:  None  Date of  last SI:  None  Date of last HI: None    Behavioral Targets:  Improve self-esteem. and Understand the relationship between addiction and mental health issues. Establish and maintain regular appointments with therapist to deal with above issues Learn how to apply self-care and psychotherapy to build a repertoire of daily habits that counteract PAWS, fatigue, depression and anxiety leading to increased resiliency and well-being.     Current MH Assignments:  List 5 ways your addiction has impacted your mental health.  and put actions into place as directed by therapist to deal with above reported issues. Report weekly on acctions taken. Attend each of the following groups, completing one topic per week, to  complete all 6 by due date, unless excused by primary counselor.  All group work to be completed each session to receive an effective outcome.     Narrative:  Client expressing willingness and effort to reestablish BEH therapy 1:1 appts. with outside   povider.  Have faxed SUJATA to psych provider, but no response.     Dimension 4: Treatment Acceptance / Resistance -   Previous Dimension Ratin  Current Dimension Ratin    STACI Diagnosis:  Alcohol Use Disorder   303.90 (F10.20) Moderate In a controlled environment  304.30 (F12.20) Cannabis Use Disorder Severe  In a controlled environment  Opioid Use Disorder, Specify if:  In a controlled environment with a severity of:  304.00 (F11.20) Severe  Stage - 4  Commitment to tx process/Stage of change- Contemplation/preparation    STACI assignments - Take action to build recovery resilience on weekly basis. Present using history, consequences of use and 5 values violated. and move up stages of change for sobriety. Increase internal motivation for sobriety. Report weekly on recovery program skills put in place to develop recovery resilience.     Narrative - Client reports ambivalence regarding commitment to abstinence from THC. Client will be doing UA test (see below) to assure abstinence. Client is acting on that ambivalence via use episodes for THC.     Dimension 5: Relapse / Continued Problem Potential -   Previous Dimension Rating:  3  Current Dimension Rating:  3  Relapses this week - YES, List See below for UA lab this week.   Urges to use - YES, List THC and alcohol cravings.   UA results -     Recent Results (from the past 168 hour(s))   Drug abuse screen 8 urine (UR)    Collection Time: 21  6:00 PM   Result Value Ref Range    Amphetamine Qual Urine Positive (A) NEG^Negative    Barbiturates Qual Urine Negative NEG^Negative    Benzodiazepine Qual Urine Negative NEG^Negative    Cannabinoids Qual Urine Positive (A) NEG^Negative    Cocaine Qual Urine Negative NEG^Negative    Ethanol Qual Urine Negative NEG^Negative    Opiates Qualitative Urine Negative NEG^Negative    PCP Qual Urine Negative NEG^Negative   THC Confirmation Quantitative Urine    Collection Time: 21  6:00 PM   Result Value Ref Range    THC  Metabolite 350 ng/mL    THC/Creatinine Ratio 449 ng/mg[creat]   Creatinine random urine    Collection Time: 21  6:00 PM   Result Value Ref Range    Creatinine Urine Random 78 mg/dL   Ethyl Glucuronide Urine    Collection Time: 21  6:00 PM   Result Value Ref Range    Ethyl Glucuronide Urine Negative          Narrative- Client admitted recent use of Alcohol and THC prior to first appt.   In IOP STACI. Agreement is he will be doing UA's weekly. Also agreement now that   based on at least two use episodes for THC in IOP - next episode results in discharge  and referral to higher level of care, which has now happened.      Dimension 6: Recovery Environment -   Previous Dimension Rating:  3  Current Dimension Ratin  Family Involvement - None  Summarize attendance at family groups and family sessions - None  Family supportive of treatment?  Yes    Community support group attendance - Yes client has reported attended several  meetings at [employer].     Recreational activities - Outdoor activities with minor daughter. Reports golfing.     Narrative - Client reports rebuilding relationships with wife and minor daughter. Reports more interaction.  Reports   Intent to return to  and reports being happy with his first experiences with sober support.     Justification for Continued Treatment at this Level of Care:  Early sobriety.     Discharge Planning:  Target Discharge Date/Timeframe:  End    Med Mgmt Provider/Appt:  See EMR   Ind therapy Provider/Appt:  See EMR for future SUJATA for provider   Family therapy Provider/Appt:  None   Other referrals:  [facility]    Has vulnerable adult status change? No    Service Coordination:  Yes with prescribing provider for adderall and attempted contact  With [facility] for recent notes.     Supervision:  None     Are Treatment Plan goals/objectives effective? NO  *If no, list changes to treatment  plan: Counselor and client will discuss referral to higher level of care  As he will not be allowed to stay in IOP STACI program due to multiple documented THC use episodes.     Are the current goals meeting client's needs? No    *If no, list the changes to treatment plan.    Will be referred to higher level of care.     Client Input / Response: Client is expressing ambivalence about abstinence from THC in particular.     *Client agrees with any changes to the treatment plan: No  *Client received copy of changes: No  *Client is aware of right to access a treatment plan review: Yes

## 2021-07-09 NOTE — ADDENDUM NOTE
Encounter addended by: Ricardo Britton [facility] on: 7/8/2021 8:15 PM   Actions taken: Clinical Note Signed

## 2021-07-12 ENCOUNTER — HOSPITAL ENCOUNTER (OUTPATIENT)
Dept: BEHAVIORAL HEALTH | Facility: CLINIC | Age: 56
End: 2021-07-12
Attending: FAMILY MEDICINE
Payer: COMMERCIAL

## 2021-07-12 DIAGNOSIS — F19.10 SUBSTANCE ABUSE (H): Primary | ICD-10-CM

## 2021-07-12 PROCEDURE — H2035 A/D TX PROGRAM, PER HOUR: HCPCS | Mod: HQ

## 2021-07-13 LAB
AMPHETAMINES UR QL SCN: ABNORMAL
BARBITURATES UR QL: ABNORMAL
BENZODIAZ UR QL: ABNORMAL
CANNABINOIDS UR QL SCN: ABNORMAL
COCAINE UR QL: ABNORMAL
CREAT UR-MCNC: 258 MG/DL
ETHANOL UR QL SCN: ABNORMAL
OPIATES UR QL SCN: ABNORMAL
PCP UR QL SCN: ABNORMAL

## 2021-07-13 PROCEDURE — 80349 CANNABINOIDS NATURAL: CPT | Performed by: FAMILY MEDICINE

## 2021-07-13 PROCEDURE — 80307 DRUG TEST PRSMV CHEM ANLYZR: CPT | Performed by: FAMILY MEDICINE

## 2021-07-13 PROCEDURE — 80324 DRUG SCREEN AMPHETAMINES 1/2: CPT | Performed by: FAMILY MEDICINE

## 2021-07-13 PROCEDURE — 82570 ASSAY OF URINE CREATININE: CPT | Performed by: FAMILY MEDICINE

## 2021-07-13 NOTE — GROUP NOTE
Group Therapy Documentation    PATIENT'S NAME: Idris Woodson  MRN:   [id number]  :   1965  ACCT. NUMBER: [id number]  DATE OF SERVICE: 21  START TIME:  5:30 PM  END TIME:  7:30 PM  FACILITATOR(S): Ricardo Britton LADC  TOPIC: BEH Group Therapy  Number of patients attending the group:  9    Group Length:  2 Hours    Group Therapy Type: Addiction and Life skill(s)    Summary of Group / Topics Discussed:    Boundaries, Communication, Interpersonal Effectiveness, and weekly check in. Recovery skills implemented in last week, cravings, relapse prevention skills used, last use date, attendance at health care appointments, physical/mental concerns.           Group Attendance:  Attended group session    Patient's response to the group topic/interactions:  cooperative with task and discussed personal experience with topic    Patient appeared to be Attentive and Engaged.        Client specific details:  Client shared he has been using THC off and on for several weeks. Reports using less than prior to STACI treatment, but has been lying about it until confronted by this counselor. Client and counselor will come to understanding on 7/15 about what next steps we will take. Client reports returning to work this week after months of absence for STACI treatment. Dimension 1, 4, 5, 6.

## 2021-07-14 ENCOUNTER — HOSPITAL ENCOUNTER (OUTPATIENT)
Dept: BEHAVIORAL HEALTH | Facility: CLINIC | Age: 56
End: 2021-07-14
Attending: FAMILY MEDICINE
Payer: COMMERCIAL

## 2021-07-14 DIAGNOSIS — F19.10 SUBSTANCE ABUSE (H): Primary | ICD-10-CM

## 2021-07-14 PROCEDURE — H2035 A/D TX PROGRAM, PER HOUR: HCPCS | Mod: HQ

## 2021-07-15 ENCOUNTER — HOSPITAL ENCOUNTER (OUTPATIENT)
Dept: BEHAVIORAL HEALTH | Facility: CLINIC | Age: 56
End: 2021-07-15
Attending: FAMILY MEDICINE
Payer: COMMERCIAL

## 2021-07-15 DIAGNOSIS — F19.10 SUBSTANCE ABUSE (H): Primary | ICD-10-CM

## 2021-07-15 LAB
AMPHET/CREAT UR: >3984 NG/MG CREAT
AMPHETAMINES UR QL CFM: NORMAL
ETHYL GLUCURONIDE UR QL SCN: NEGATIVE NG/ML
LEVEL OF DETECTION: NORMAL
MDA/CREAT UR: NOT DETECTED NG/MG CREAT
MDMA/CREAT UR: NOT DETECTED NG/MG CREAT
METHAMPHET/CREAT UR: NOT DETECTED NG/MG CREAT

## 2021-07-15 PROCEDURE — H2035 A/D TX PROGRAM, PER HOUR: HCPCS | Mod: HQ

## 2021-07-15 NOTE — GROUP NOTE
"Group Therapy Documentation    PATIENT'S NAME: Idris Woodson  MRN:   [id number]  :   1965  ACCT. NUMBER: [id number]  DATE OF SERVICE: 21  START TIME:  5:30 PM  END TIME:  7:30 PM  FACILITATOR(S): Ricardo Britton LADC  TOPIC: BEH Group Therapy  Number of patients attending the group:  5    Group Length:  2 Hours    Group Therapy Type: Addiction    Summary of Group / Topics Discussed:    11 Criteria for Substance Use disorder, mild moderate, severe explanation/discussion. 6 Dimensions (Bio, Psycho, Social) components of STACI assessment  , treatment plan and weekly progress tracking.       Group Attendance:  Attended group session    Patient's response to the group topic/interactions:  confronted peers appropriately and cooperative with task    Patient appeared to be Engaged.        Client specific details:  Client shared that he \"owns\" diagnosis of Opioid addiction but didn't accept STACI diagnosis for marijuana which lead to group discussion with counselor about client inability to stop using THC since he started program. Client verbalized understanding criteria, possibly still ambivalent about abstinence. Dimension 4, 5, 6.      "

## 2021-07-15 NOTE — TELEPHONE ENCOUNTER
----- Message from JESUS Alcaraz sent at 7/15/2021 10:54 AM CDT -----  Regarding: Add Phase II appointments for client at [facility]  Patient Name:  See above  Location of programming: [facility]  Start Date:  7/15/21  Group: ([id number] M, W, TH 5:30PM  Provider: JESUS Prasad  Number of visits to be scheduled: 16  Length/Duration of Appointment in minutes: 120  Visit Type (VIDEO/TELEPHONE/IN-PERSON): In person  Additional notes:    JESUS Prasad

## 2021-07-16 LAB
CANNABINOIDS UR CFM-MCNC: 1596 NG/ML
CARBOXYTHC/CREAT UR: 619 NG/MG CREAT

## 2021-07-16 NOTE — GROUP NOTE
"Group Therapy Documentation    PATIENT'S NAME: Idris Woodson  MRN:   [id number]  :   1965  ACCT. NUMBER: [id number]  DATE OF SERVICE: 7/15/21  START TIME:  5:30 PM  END TIME:  7:30 PM  FACILITATOR(S): Ricardo Britton LADC  TOPIC: BEH Group Therapy  Number of patients attending the group:  7    Group Length:  2 Hours    Group Therapy Type: Skills/Education    Summary of Group / Topics Discussed:    \"Stop the Chaos\" early life skills for relapse prevention video and discussion.       Group Attendance:  Attended group session    Patient's response to the group topic/interactions:  cooperative with task    Patient appeared to be Attentive and Engaged.        Client specific details:  Client participated in group discussion with peers and this counselor and asked question about going to places to eat that don't serve alcohol as an alternative to places with alcohol and having boundary discussion. Group decided that if it is too difficult to 1. Have boundary discussion 2. Be around alcohol, then that is what we should do. Dimension 4, 5, 6..      "

## 2021-07-16 NOTE — PROGRESS NOTES
[facility] Weekly Treatment Plan Review      ATTENDANCE for the following date span:   -     Date Monday 7/12 Tuesday 7/13 Wednesday 7/14 Thursday 7/15 Friday    Group Therapy 2 hours  0 hours 2.0 hours 0 hours  hours   Individual Therapy        Family Therapy        Other (Specify)          Patient did not have any absences during this time period (list absence dates and reason for absence).        Weekly Treatment Plan Review     Treatment Plan initiated on: 21.    Dimension1: Acute Intoxication/Withdrawal Potential -   Previous Dimension Ratin  Current Dimension Ratin  Date of Last Use Alcohol: from 3/8 to   THC: From 2/15 to  Opioids (pills) likely .   THC use reported week of .      Any reports of withdrawal symptoms - No     Client had use episode for THC week of . See EMR for UA and associated THC level reports.     Dimension 2: Biomedical Conditions & Complications -   Previous Dimension Ratin  Current Dimension Ratin  Medical Concerns:  See below.   Current Medications & Medication Changes:    Past Medical History:   Diagnosis Date     ADHD      Arthritis      Depressive disorder      Hearing loss      Hypertension      Current Outpatient Medications   Medication     amLODIPine (NORVASC) 10 MG tablet     amphetamine-dextroamphetamine (ADDERALL) 20 MG tablet     citalopram (CELEXA) 40 MG tablet     lisinopril (ZESTRIL) 40 MG tablet     metoprolol succinate ER (TOPROL-XL) 25 MG 24 hr tablet     No current facility-administered medications for this encounter.     Medication Prescriber:  See EMR meds prescribed by Nima Posey MD    [address] [place] [address]    [phone] (Fax)      Taking meds as prescribed? Yes  VM  from Dr. Posey indicates he believes client is taking Adderall as prescribed and has not gotten early renewals.     Medication side effects or concerns: This counselor has attempted to  determine therapeutic level of amphetamines In UA test.  Is unable to do that via medical providers. See EMR for Amphetamine levels in UA tests.       Outside medical appointments this week (list provider and reason for visit):  Would be with [facility].     Dimension 3: Emotional/Behavioral Conditions & Complications -   Previous Dimension Ratin  Current Dimension Ratin  PHQ9     PHQ-9 SCORE 3/15/2021 2021   PHQ-9 Total Score 12 9     GAD7     AMNA-7 SCORE 3/15/2021 2021   Total Score 10 7     Mental health diagnosis: History of trauma issues due his parents getting  when he was 10 years old, due to his brother's death from an alcohol and drug overdose in  and due to be bullied for his hearing loss by peers at school when he was growing up. The patient reported having significant grief and loss issues regarding the deaths of his grandparents and his brother.     Date of last SIB:  None  Date of  last SI:  None  Date of last HI: None    Behavioral Targets:  Improve self-esteem. and Understand the relationship between addiction and mental health issues. Establish and maintain regular appointments with therapist to deal with above issues Learn how to apply self-care and psychotherapy to build a repertoire of daily habits that counteract PAWS, fatigue, depression and anxiety leading to increased resiliency and well-being.     Current MH Assignments:  List 5 ways your addiction has impacted your mental health.  and put actions into place as directed by therapist to deal with above reported issues. Report weekly on acctions taken. Attend each of the following groups, completing one topic per week, to  complete all 6 by due date, unless excused by primary counselor.  All group work to be completed each session to receive an effective outcome.     Narrative:  Client expressing willingness and effort to reestablish BEH therapy 1:1 appts. with outside   povider. Have faxed SUJATA to psych  provider, but no response.     Dimension 4: Treatment Acceptance / Resistance -   Previous Dimension Ratin  Current Dimension Ratin    STACI Diagnosis:  Alcohol Use Disorder   303.90 (F10.20) Moderate In a controlled environment  304.30 (F12.20) Cannabis Use Disorder Severe  In a controlled environment  Opioid Use Disorder, Specify if:  In a controlled environment with a severity of:  304.00 (F11.20) Severe  Stage - 2  Commitment to tx process/Stage of change- Contemplation/preparation    STACI assignments - Take action to build recovery resilience on weekly basis. Present using history, consequences of use and 5 values violated. and move up stages of change for sobriety. Increase internal motivation for sobriety. Report weekly on recovery program skills put in place to develop recovery resilience.     Narrative - Client reports ambivalence regarding commitment to abstinence from THC. Client will be doing UA test (see below) to assure abstinence. Client is acting on that ambivalence via use episodes for THC.     Dimension 5: Relapse / Continued Problem Potential -   Previous Dimension Rating:  3  Current Dimension Rating:  3  Relapses this week - YES, List See below for UA lab this week.   Urges to use - YES, List THC and alcohol cravings.   UA results -     Recent Results (from the past 168 hour(s))   Drug abuse screen 8 urine (UR)    Collection Time: 21  1:40 PM   Result Value Ref Range    Amphetamines Urine Screen Positive (A) Screen Negative    Barbiturates Urine Screen Negative Screen Negative    Benzodiazepines Urine Screen Negative Screen Negative    Cannabinoids Urine Screen Positive (A) Screen Negative    Cocaine Urine Screen Negative Screen Negative    Ethanol Urine Screen Negative Screen Negative    Opiates Urine Screen Negative Screen Negative    PCP Urine Screen Negative Screen Negative   Amphetamines Expanded Urine    Collection Time: 21  1:40 PM   Result Value Ref Range     Amphetamine Urine >3984 ng/mg creat    Methamphetamine Urine Not Detected ng/mg creat    MDA Urine Not Detected ng/mg creat    MDMA Urine Not Detected ng/mg creat    Level of Detection Comment     Amphetamines +POSITIVE+    Ethyl Glucuronide Urine    Collection Time: 21  1:40 PM   Result Value Ref Range    Ethyl Glucuronide Urine Negative Lwjwhl=809 ng/mL   Creatinine random urine    Collection Time: 21  1:40 PM   Result Value Ref Range    Creatinine Urine mg/dL 258 mg/dL     Narrative- Client admitted recent use of Alcohol and THC prior to first appt.   In IOP STACI. Agreement is he will be doing UA's weekly. Also agreement now that   based on at least two use episodes for THC in IOP - next episode results in discharge  and referral to higher level of care.      Dimension 6: Recovery Environment -   Previous Dimension Rating:  3  Current Dimension Ratin  Family Involvement - None  Summarize attendance at family groups and family sessions - None  Family supportive of treatment?  Yes    Community support group attendance - Yes client has reported attended several NA meetings at [email or web address].     Recreational activities - Outdoor activities with minor daughter. Reports golfing.     Narrative - Client reports rebuilding relationships with wife and minor daughter. Reports more interaction.  Reports   Intent to return to NA and reports being happy with his first experiences with sober support. Reports returning to work at [employer] but needs to find new position. Reports touring [employer] to determine education program he wants to attend for another career in the future.     Justification for Continued Treatment at this Level of Care:  Client and counselor agreed that client will continue   In IOP Phase II until THC levels go to zero. Client commits to attending NA on regular basis again.     Discharge Planning:  Target Discharge Date/Timeframe:     Med Mgmt Provider/Appt:  See EMR   Ind  therapy Provider/Appt:  See EMR for future SUJATA for provider   Family therapy Provider/Appt:  None   Other referrals:  [facility]    Has vulnerable adult status change? No    Service Coordination:  Yes with prescribing provider for adderall and attempted contact  With [facility] for recent notes.     Supervision:  None     Are Treatment Plan goals/objectives effective? NO    *If no, list changes to treatment plan: Counselor and client will discuss referral to higher level of care  as he will not be allowed to stay in IOP STACI program due to multiple documented THC use episodes.     Are the current goals meeting client's needs? yes    *If no, list the changes to treatment plan.  none    Client Input / Response: Client is expressing ambivalence about abstinence from THC in particular.     *Client agrees with any changes to the treatment plan: Yes  *Client received copy of changes: No  *Client is aware of right to access a treatment plan review: Yes

## 2021-07-16 NOTE — ADDENDUM NOTE
Encounter addended by: Ricardo Britton LADC on: 7/16/2021 2:06 PM   Actions taken: Clinical Note Signed

## 2021-07-19 ENCOUNTER — HOSPITAL ENCOUNTER (OUTPATIENT)
Dept: BEHAVIORAL HEALTH | Facility: CLINIC | Age: 56
End: 2021-07-19
Attending: FAMILY MEDICINE
Payer: COMMERCIAL

## 2021-07-19 DIAGNOSIS — F19.10 SUBSTANCE ABUSE (H): Primary | ICD-10-CM

## 2021-07-19 PROCEDURE — 80349 CANNABINOIDS NATURAL: CPT | Performed by: FAMILY MEDICINE

## 2021-07-19 PROCEDURE — 82570 ASSAY OF URINE CREATININE: CPT | Performed by: FAMILY MEDICINE

## 2021-07-19 PROCEDURE — H2035 A/D TX PROGRAM, PER HOUR: HCPCS | Mod: HQ

## 2021-07-19 PROCEDURE — 80320 DRUG SCREEN QUANTALCOHOLS: CPT | Performed by: FAMILY MEDICINE

## 2021-07-19 PROCEDURE — 80324 DRUG SCREEN AMPHETAMINES 1/2: CPT | Performed by: FAMILY MEDICINE

## 2021-07-20 LAB
AMPHETAMINES UR QL SCN: ABNORMAL
CANNABINOIDS UR QL SCN: ABNORMAL
COCAINE UR QL: ABNORMAL
CREAT UR-MCNC: 265 MG/DL
ETHANOL UR QL SCN: ABNORMAL
OPIATES UR QL SCN: ABNORMAL

## 2021-07-20 NOTE — GROUP NOTE
Group Therapy Documentation    PATIENT'S NAME: Idris Woodson  MRN:   [id number]  :   1965  ACCT. NUMBER: [id number]  DATE OF SERVICE: 21  START TIME:  5:30 PM  END TIME:  7:30 PM  FACILITATOR(S): Ricardo Britton LADC  TOPIC: BEH Group Therapy  Number of patients attending the group:  7    Group Length:  2 Hours    Group Therapy Type: Addiction and Life skill(s)    Summary of Group / Topics Discussed:    Balanced Lifestyle , Coping Skills/Lifestyle Managemet, Choices in Recovery, Leisure Exploration/Use of Leisure Time, Self-Care Activities, Sleep Hygiene, and Symptom Management Weekly check in, relapse prevention skills used over weekend. Post Acute Withdrawal symptom management.           Group Attendance:  Attended group session    Patient's response to the group topic/interactions:  confronted peers appropriately and discussed personal experience with topic    Patient appeared to be Actively participating, Attentive and Engaged.        Client specific details:  Client shared that he is struggling to remain abstinent from marijuana. Client shared that stress of going back to work, relationship strain with wife over money and daughter having ongoing depressive symptoms are causing him to really crave THC. Counselor and group discussed sober actions (including clients offering to attend NA with him) he can take (breathing, mindfulness, exercise) to lessen intensity of those events. Dimension 3, 4, 5.

## 2021-07-23 LAB
AMPHET/CREAT UR: >3774 NG/MG CREAT
AMPHETAMINES UR QL CFM: NORMAL
CANNABINOIDS UR CFM-MCNC: 47 NG/ML
CARBOXYTHC/CREAT UR: 18 NG/MG CREAT
LEVEL OF DETECTION: NORMAL
MDA/CREAT UR: NOT DETECTED NG/MG CREAT
MDMA/CREAT UR: NOT DETECTED NG/MG CREAT
METHAMPHET/CREAT UR: NOT DETECTED NG/MG CREAT

## 2021-08-05 ENCOUNTER — TELEPHONE (OUTPATIENT)
Dept: BEHAVIORAL HEALTH | Facility: CLINIC | Age: 56
End: 2021-08-05

## 2021-08-05 DIAGNOSIS — F19.10 SUBSTANCE ABUSE (H): Primary | ICD-10-CM

## 2021-08-05 NOTE — TELEPHONE ENCOUNTER
----- Message from JESUS Alcaraz sent at 8/4/2021  6:33 PM CDT -----  Regarding: Cancel all future mixed IOP STACI Appointments for client at [facility]  Please arrange to cancel all future mixed IOP STACI appointments for the above client at the [facility] location effective immediately. Client has withdrawn from program. [phone].     Thanks    JESUS Prasad

## 2021-09-19 ENCOUNTER — HEALTH MAINTENANCE LETTER (OUTPATIENT)
Age: 56
End: 2021-09-19

## 2022-06-26 ENCOUNTER — HEALTH MAINTENANCE LETTER (OUTPATIENT)
Age: 57
End: 2022-06-26

## 2022-11-21 ENCOUNTER — HEALTH MAINTENANCE LETTER (OUTPATIENT)
Age: 57
End: 2022-11-21

## 2023-11-26 ENCOUNTER — HEALTH MAINTENANCE LETTER (OUTPATIENT)
Age: 58
End: 2023-11-26

## 2024-01-22 ENCOUNTER — TRANSFERRED RECORDS (OUTPATIENT)
Dept: HEALTH INFORMATION MANAGEMENT | Facility: CLINIC | Age: 59
End: 2024-01-22

## 2024-03-14 ENCOUNTER — TRANSFERRED RECORDS (OUTPATIENT)
Dept: HEALTH INFORMATION MANAGEMENT | Facility: CLINIC | Age: 59
End: 2024-03-14

## 2024-03-21 ENCOUNTER — TRANSFERRED RECORDS (OUTPATIENT)
Dept: HEALTH INFORMATION MANAGEMENT | Facility: CLINIC | Age: 59
End: 2024-03-21

## 2024-05-08 ENCOUNTER — MEDICAL CORRESPONDENCE (OUTPATIENT)
Dept: HEALTH INFORMATION MANAGEMENT | Facility: CLINIC | Age: 59
End: 2024-05-08
Payer: COMMERCIAL

## 2024-05-13 ENCOUNTER — HOSPITAL ENCOUNTER (INPATIENT)
Facility: CLINIC | Age: 59
LOS: 1 days | Discharge: HOME | End: 2024-05-14
Attending: ORTHOPAEDIC SURGERY | Admitting: ORTHOPAEDIC SURGERY
Payer: COMMERCIAL

## 2024-05-13 ENCOUNTER — APPOINTMENT (OUTPATIENT)
Dept: GENERAL RADIOLOGY | Facility: CLINIC | Age: 59
End: 2024-05-13
Attending: ORTHOPAEDIC SURGERY
Payer: COMMERCIAL

## 2024-05-13 ENCOUNTER — ANESTHESIA (OUTPATIENT)
Dept: SURGERY | Facility: CLINIC | Age: 59
End: 2024-05-13
Payer: COMMERCIAL

## 2024-05-13 ENCOUNTER — ANESTHESIA EVENT (OUTPATIENT)
Dept: SURGERY | Facility: CLINIC | Age: 59
End: 2024-05-13
Payer: COMMERCIAL

## 2024-05-13 DIAGNOSIS — Z96.649 STATUS POST REVISION OF TOTAL HIP REPLACEMENT: Primary | ICD-10-CM

## 2024-05-13 PROBLEM — M25.352 HIP INSTABILITY, LEFT: Status: ACTIVE | Noted: 2024-05-13

## 2024-05-13 LAB
ABO/RH(D): NORMAL
ANTIBODY SCREEN: NEGATIVE
CREAT SERPL-MCNC: 0.64 MG/DL (ref 0.67–1.17)
EGFRCR SERPLBLD CKD-EPI 2021: >90 ML/MIN/1.73M2
ERYTHROCYTE [DISTWIDTH] IN BLOOD BY AUTOMATED COUNT: 12.2 % (ref 10–15)
GLUCOSE SERPL-MCNC: 139 MG/DL (ref 70–99)
HCT VFR BLD AUTO: 36.2 % (ref 40–53)
HGB BLD-MCNC: 12.7 G/DL (ref 13.3–17.7)
MCH RBC QN AUTO: 32.2 PG (ref 26.5–33)
MCHC RBC AUTO-ENTMCNC: 35.1 G/DL (ref 31.5–36.5)
MCV RBC AUTO: 92 FL (ref 78–100)
PLATELET # BLD AUTO: 255 10E3/UL (ref 150–450)
POTASSIUM SERPL-SCNC: 3.2 MMOL/L (ref 3.4–5.3)
POTASSIUM SERPL-SCNC: 4 MMOL/L (ref 3.4–5.3)
RBC # BLD AUTO: 3.94 10E6/UL (ref 4.4–5.9)
SPECIMEN EXPIRATION DATE: NORMAL
WBC # BLD AUTO: 4.4 10E3/UL (ref 4–11)

## 2024-05-13 PROCEDURE — 250N000011 HC RX IP 250 OP 636: Performed by: NURSE ANESTHETIST, CERTIFIED REGISTERED

## 2024-05-13 PROCEDURE — 250N000011 HC RX IP 250 OP 636: Performed by: ANESTHESIOLOGY

## 2024-05-13 PROCEDURE — 0SRS03A REPLACEMENT OF LEFT HIP JOINT, FEMORAL SURFACE WITH CERAMIC SYNTHETIC SUBSTITUTE, UNCEMENTED, OPEN APPROACH: ICD-10-PCS | Performed by: ORTHOPAEDIC SURGERY

## 2024-05-13 PROCEDURE — 999N000141 HC STATISTIC PRE-PROCEDURE NURSING ASSESSMENT: Performed by: ORTHOPAEDIC SURGERY

## 2024-05-13 PROCEDURE — C1776 JOINT DEVICE (IMPLANTABLE): HCPCS | Performed by: ORTHOPAEDIC SURGERY

## 2024-05-13 PROCEDURE — 250N000011 HC RX IP 250 OP 636: Performed by: ORTHOPAEDIC SURGERY

## 2024-05-13 PROCEDURE — 82565 ASSAY OF CREATININE: CPT | Performed by: ORTHOPAEDIC SURGERY

## 2024-05-13 PROCEDURE — 258N000003 HC RX IP 258 OP 636: Performed by: ORTHOPAEDIC SURGERY

## 2024-05-13 PROCEDURE — 258N000003 HC RX IP 258 OP 636: Performed by: NURSE ANESTHETIST, CERTIFIED REGISTERED

## 2024-05-13 PROCEDURE — 27132 TOTAL HIP ARTHROPLASTY: CPT | Performed by: ANESTHESIOLOGY

## 2024-05-13 PROCEDURE — 82947 ASSAY GLUCOSE BLOOD QUANT: CPT | Performed by: ANESTHESIOLOGY

## 2024-05-13 PROCEDURE — 250N000013 HC RX MED GY IP 250 OP 250 PS 637: Performed by: ORTHOPAEDIC SURGERY

## 2024-05-13 PROCEDURE — 250N000025 HC SEVOFLURANE, PER MIN: Performed by: ORTHOPAEDIC SURGERY

## 2024-05-13 PROCEDURE — 258N000003 HC RX IP 258 OP 636: Performed by: ANESTHESIOLOGY

## 2024-05-13 PROCEDURE — 272N000001 HC OR GENERAL SUPPLY STERILE: Performed by: ORTHOPAEDIC SURGERY

## 2024-05-13 PROCEDURE — 999N000065 XR PELVIS AND HIP PORTABLE LEFT 1 VIEW

## 2024-05-13 PROCEDURE — 710N000009 HC RECOVERY PHASE 1, LEVEL 1, PER MIN: Performed by: ORTHOPAEDIC SURGERY

## 2024-05-13 PROCEDURE — 84132 ASSAY OF SERUM POTASSIUM: CPT | Performed by: ANESTHESIOLOGY

## 2024-05-13 PROCEDURE — 86900 BLOOD TYPING SEROLOGIC ABO: CPT | Performed by: ORTHOPAEDIC SURGERY

## 2024-05-13 PROCEDURE — 36415 COLL VENOUS BLD VENIPUNCTURE: CPT | Performed by: ANESTHESIOLOGY

## 2024-05-13 PROCEDURE — 360N000085 HC SURGERY LEVEL 5 W/ FLUORO, PER MIN: Performed by: ORTHOPAEDIC SURGERY

## 2024-05-13 PROCEDURE — 370N000017 HC ANESTHESIA TECHNICAL FEE, PER MIN: Performed by: ORTHOPAEDIC SURGERY

## 2024-05-13 PROCEDURE — 36415 COLL VENOUS BLD VENIPUNCTURE: CPT | Performed by: ORTHOPAEDIC SURGERY

## 2024-05-13 PROCEDURE — 999N000179 XR SURGERY CARM FLUORO LESS THAN 5 MIN W STILLS

## 2024-05-13 PROCEDURE — 84132 ASSAY OF SERUM POTASSIUM: CPT | Performed by: ORTHOPAEDIC SURGERY

## 2024-05-13 PROCEDURE — 120N000001 HC R&B MED SURG/OB

## 2024-05-13 PROCEDURE — 250N000009 HC RX 250: Performed by: NURSE ANESTHETIST, CERTIFIED REGISTERED

## 2024-05-13 PROCEDURE — 258N000001 HC RX 258: Performed by: ORTHOPAEDIC SURGERY

## 2024-05-13 PROCEDURE — 27132 TOTAL HIP ARTHROPLASTY: CPT | Performed by: NURSE ANESTHETIST, CERTIFIED REGISTERED

## 2024-05-13 PROCEDURE — 85027 COMPLETE CBC AUTOMATED: CPT | Performed by: ORTHOPAEDIC SURGERY

## 2024-05-13 PROCEDURE — 0SPS0JZ REMOVAL OF SYNTHETIC SUBSTITUTE FROM LEFT HIP JOINT, FEMORAL SURFACE, OPEN APPROACH: ICD-10-PCS | Performed by: ORTHOPAEDIC SURGERY

## 2024-05-13 PROCEDURE — 250N000009 HC RX 250: Performed by: ANESTHESIOLOGY

## 2024-05-13 DEVICE — BIOLOX® DELTA, CERAMIC FEMORAL HEAD, M, Ø 36/0, TAPER 12/14
Type: IMPLANTABLE DEVICE | Site: HIP | Status: FUNCTIONAL
Brand: BIOLOX® DELTA

## 2024-05-13 DEVICE — IMPLANTABLE DEVICE
Type: IMPLANTABLE DEVICE | Site: HIP | Status: FUNCTIONAL
Brand: KINECTIV®

## 2024-05-13 RX ORDER — PROPOFOL 10 MG/ML
INJECTION, EMULSION INTRAVENOUS PRN
Status: DISCONTINUED | OUTPATIENT
Start: 2024-05-13 | End: 2024-05-13

## 2024-05-13 RX ORDER — ACETAMINOPHEN 325 MG/1
650 TABLET ORAL EVERY 4 HOURS PRN
Status: DISCONTINUED | OUTPATIENT
Start: 2024-05-16 | End: 2024-05-14 | Stop reason: HOSPADM

## 2024-05-13 RX ORDER — DEXTROAMPHETAMINE SACCHARATE, AMPHETAMINE ASPARTATE, DEXTROAMPHETAMINE SULFATE AND AMPHETAMINE SULFATE 5; 5; 5; 5 MG/1; MG/1; MG/1; MG/1
20 TABLET ORAL 3 TIMES DAILY
Status: DISCONTINUED | OUTPATIENT
Start: 2024-05-13 | End: 2024-05-14 | Stop reason: HOSPADM

## 2024-05-13 RX ORDER — AMOXICILLIN 250 MG
1 CAPSULE ORAL 2 TIMES DAILY
Status: DISCONTINUED | OUTPATIENT
Start: 2024-05-13 | End: 2024-05-14 | Stop reason: HOSPADM

## 2024-05-13 RX ORDER — AMOXICILLIN 250 MG
1-2 CAPSULE ORAL 2 TIMES DAILY
Qty: 30 TABLET | Refills: 0 | Status: SHIPPED | OUTPATIENT
Start: 2024-05-13

## 2024-05-13 RX ORDER — NALOXONE HYDROCHLORIDE 0.4 MG/ML
0.2 INJECTION, SOLUTION INTRAMUSCULAR; INTRAVENOUS; SUBCUTANEOUS
Status: DISCONTINUED | OUTPATIENT
Start: 2024-05-13 | End: 2024-05-14 | Stop reason: HOSPADM

## 2024-05-13 RX ORDER — ONDANSETRON 4 MG/1
4 TABLET, ORALLY DISINTEGRATING ORAL EVERY 30 MIN PRN
Status: DISCONTINUED | OUTPATIENT
Start: 2024-05-13 | End: 2024-05-13 | Stop reason: HOSPADM

## 2024-05-13 RX ORDER — OXYCODONE HYDROCHLORIDE 5 MG/1
10 TABLET ORAL EVERY 4 HOURS PRN
Status: DISCONTINUED | OUTPATIENT
Start: 2024-05-13 | End: 2024-05-14 | Stop reason: HOSPADM

## 2024-05-13 RX ORDER — FENTANYL CITRATE 50 UG/ML
25 INJECTION, SOLUTION INTRAMUSCULAR; INTRAVENOUS ONCE
Status: COMPLETED | OUTPATIENT
Start: 2024-05-13 | End: 2024-05-13

## 2024-05-13 RX ORDER — LISINOPRIL 40 MG/1
40 TABLET ORAL DAILY
Status: DISCONTINUED | OUTPATIENT
Start: 2024-05-14 | End: 2024-05-14 | Stop reason: HOSPADM

## 2024-05-13 RX ORDER — NALOXONE HYDROCHLORIDE 0.4 MG/ML
0.1 INJECTION, SOLUTION INTRAMUSCULAR; INTRAVENOUS; SUBCUTANEOUS
Status: DISCONTINUED | OUTPATIENT
Start: 2024-05-13 | End: 2024-05-13 | Stop reason: HOSPADM

## 2024-05-13 RX ORDER — ASPIRIN 325 MG
325 TABLET, DELAYED RELEASE (ENTERIC COATED) ORAL DAILY
Status: DISCONTINUED | OUTPATIENT
Start: 2024-05-13 | End: 2024-05-14 | Stop reason: HOSPADM

## 2024-05-13 RX ORDER — LIDOCAINE 40 MG/G
CREAM TOPICAL
Status: DISCONTINUED | OUTPATIENT
Start: 2024-05-13 | End: 2024-05-14 | Stop reason: HOSPADM

## 2024-05-13 RX ORDER — ACETAMINOPHEN 325 MG/1
975 TABLET ORAL EVERY 8 HOURS
Status: DISCONTINUED | OUTPATIENT
Start: 2024-05-13 | End: 2024-05-14 | Stop reason: HOSPADM

## 2024-05-13 RX ORDER — HYDROXYZINE HYDROCHLORIDE 25 MG/1
25 TABLET, FILM COATED ORAL EVERY 6 HOURS PRN
Qty: 30 TABLET | Refills: 0 | Status: SHIPPED | OUTPATIENT
Start: 2024-05-13

## 2024-05-13 RX ORDER — FENTANYL CITRATE 0.05 MG/ML
50 INJECTION, SOLUTION INTRAMUSCULAR; INTRAVENOUS EVERY 5 MIN PRN
Status: DISCONTINUED | OUTPATIENT
Start: 2024-05-13 | End: 2024-05-13 | Stop reason: HOSPADM

## 2024-05-13 RX ORDER — EPHEDRINE SULFATE 50 MG/ML
INJECTION, SOLUTION INTRAMUSCULAR; INTRAVENOUS; SUBCUTANEOUS PRN
Status: DISCONTINUED | OUTPATIENT
Start: 2024-05-13 | End: 2024-05-13

## 2024-05-13 RX ORDER — OXYCODONE HYDROCHLORIDE 5 MG/1
5 TABLET ORAL EVERY 4 HOURS PRN
Status: DISCONTINUED | OUTPATIENT
Start: 2024-05-13 | End: 2024-05-14 | Stop reason: HOSPADM

## 2024-05-13 RX ORDER — AMLODIPINE BESYLATE 10 MG/1
10 TABLET ORAL DAILY
Status: DISCONTINUED | OUTPATIENT
Start: 2024-05-14 | End: 2024-05-14 | Stop reason: HOSPADM

## 2024-05-13 RX ORDER — ASPIRIN 325 MG
325 TABLET, DELAYED RELEASE (ENTERIC COATED) ORAL DAILY
Qty: 30 TABLET | Refills: 0 | Status: SHIPPED | OUTPATIENT
Start: 2024-05-13

## 2024-05-13 RX ORDER — FENTANYL CITRATE 50 UG/ML
INJECTION, SOLUTION INTRAMUSCULAR; INTRAVENOUS PRN
Status: DISCONTINUED | OUTPATIENT
Start: 2024-05-13 | End: 2024-05-13

## 2024-05-13 RX ORDER — SODIUM CHLORIDE, SODIUM LACTATE, POTASSIUM CHLORIDE, CALCIUM CHLORIDE 600; 310; 30; 20 MG/100ML; MG/100ML; MG/100ML; MG/100ML
INJECTION, SOLUTION INTRAVENOUS CONTINUOUS
Status: DISCONTINUED | OUTPATIENT
Start: 2024-05-13 | End: 2024-05-14 | Stop reason: HOSPADM

## 2024-05-13 RX ORDER — NALOXONE HYDROCHLORIDE 0.4 MG/ML
0.4 INJECTION, SOLUTION INTRAMUSCULAR; INTRAVENOUS; SUBCUTANEOUS
Status: DISCONTINUED | OUTPATIENT
Start: 2024-05-13 | End: 2024-05-14 | Stop reason: HOSPADM

## 2024-05-13 RX ORDER — HYDROXYZINE HYDROCHLORIDE 25 MG/1
25 TABLET, FILM COATED ORAL EVERY 6 HOURS PRN
Status: DISCONTINUED | OUTPATIENT
Start: 2024-05-13 | End: 2024-05-14 | Stop reason: HOSPADM

## 2024-05-13 RX ORDER — HYDROMORPHONE HCL IN WATER/PF 6 MG/30 ML
0.4 PATIENT CONTROLLED ANALGESIA SYRINGE INTRAVENOUS
Status: DISCONTINUED | OUTPATIENT
Start: 2024-05-13 | End: 2024-05-14 | Stop reason: HOSPADM

## 2024-05-13 RX ORDER — LIDOCAINE HYDROCHLORIDE 20 MG/ML
INJECTION, SOLUTION INFILTRATION; PERINEURAL PRN
Status: DISCONTINUED | OUTPATIENT
Start: 2024-05-13 | End: 2024-05-13

## 2024-05-13 RX ORDER — ONDANSETRON 2 MG/ML
4 INJECTION INTRAMUSCULAR; INTRAVENOUS EVERY 6 HOURS PRN
Status: DISCONTINUED | OUTPATIENT
Start: 2024-05-13 | End: 2024-05-14 | Stop reason: HOSPADM

## 2024-05-13 RX ORDER — SODIUM CHLORIDE, SODIUM LACTATE, POTASSIUM CHLORIDE, CALCIUM CHLORIDE 600; 310; 30; 20 MG/100ML; MG/100ML; MG/100ML; MG/100ML
INJECTION, SOLUTION INTRAVENOUS CONTINUOUS
Status: DISCONTINUED | OUTPATIENT
Start: 2024-05-13 | End: 2024-05-13 | Stop reason: HOSPADM

## 2024-05-13 RX ORDER — METOPROLOL SUCCINATE 25 MG/1
25 TABLET, EXTENDED RELEASE ORAL DAILY
Status: DISCONTINUED | OUTPATIENT
Start: 2024-05-14 | End: 2024-05-14 | Stop reason: HOSPADM

## 2024-05-13 RX ORDER — CEFAZOLIN SODIUM/WATER 2 G/20 ML
2 SYRINGE (ML) INTRAVENOUS
Status: COMPLETED | OUTPATIENT
Start: 2024-05-13 | End: 2024-05-13

## 2024-05-13 RX ORDER — HYDROMORPHONE HCL IN WATER/PF 6 MG/30 ML
0.4 PATIENT CONTROLLED ANALGESIA SYRINGE INTRAVENOUS EVERY 5 MIN PRN
Status: DISCONTINUED | OUTPATIENT
Start: 2024-05-13 | End: 2024-05-13 | Stop reason: HOSPADM

## 2024-05-13 RX ORDER — VANCOMYCIN HYDROCHLORIDE 1 G/20ML
INJECTION, POWDER, LYOPHILIZED, FOR SOLUTION INTRAVENOUS PRN
Status: DISCONTINUED | OUTPATIENT
Start: 2024-05-13 | End: 2024-05-13 | Stop reason: HOSPADM

## 2024-05-13 RX ORDER — HYDROMORPHONE HCL IN WATER/PF 6 MG/30 ML
0.2 PATIENT CONTROLLED ANALGESIA SYRINGE INTRAVENOUS
Status: DISCONTINUED | OUTPATIENT
Start: 2024-05-13 | End: 2024-05-14 | Stop reason: HOSPADM

## 2024-05-13 RX ORDER — METHOCARBAMOL 750 MG/1
750 TABLET, FILM COATED ORAL EVERY 6 HOURS PRN
Status: DISCONTINUED | OUTPATIENT
Start: 2024-05-13 | End: 2024-05-14 | Stop reason: HOSPADM

## 2024-05-13 RX ORDER — CITALOPRAM HYDROBROMIDE 10 MG/1
40 TABLET ORAL DAILY
Status: DISCONTINUED | OUTPATIENT
Start: 2024-05-14 | End: 2024-05-14 | Stop reason: HOSPADM

## 2024-05-13 RX ORDER — OXYCODONE HYDROCHLORIDE 5 MG/1
5-10 TABLET ORAL EVERY 4 HOURS PRN
Qty: 25 TABLET | Refills: 0 | Status: SHIPPED | OUTPATIENT
Start: 2024-05-13

## 2024-05-13 RX ORDER — HYDROMORPHONE HYDROCHLORIDE 1 MG/ML
INJECTION, SOLUTION INTRAMUSCULAR; INTRAVENOUS; SUBCUTANEOUS PRN
Status: DISCONTINUED | OUTPATIENT
Start: 2024-05-13 | End: 2024-05-13

## 2024-05-13 RX ORDER — POLYETHYLENE GLYCOL 3350 17 G/17G
17 POWDER, FOR SOLUTION ORAL DAILY
Status: DISCONTINUED | OUTPATIENT
Start: 2024-05-14 | End: 2024-05-14 | Stop reason: HOSPADM

## 2024-05-13 RX ORDER — PROCHLORPERAZINE MALEATE 10 MG
10 TABLET ORAL EVERY 6 HOURS PRN
Status: DISCONTINUED | OUTPATIENT
Start: 2024-05-13 | End: 2024-05-14 | Stop reason: HOSPADM

## 2024-05-13 RX ORDER — ONDANSETRON 2 MG/ML
4 INJECTION INTRAMUSCULAR; INTRAVENOUS EVERY 30 MIN PRN
Status: DISCONTINUED | OUTPATIENT
Start: 2024-05-13 | End: 2024-05-13 | Stop reason: HOSPADM

## 2024-05-13 RX ORDER — ONDANSETRON 2 MG/ML
INJECTION INTRAMUSCULAR; INTRAVENOUS PRN
Status: DISCONTINUED | OUTPATIENT
Start: 2024-05-13 | End: 2024-05-13

## 2024-05-13 RX ORDER — DIPHENHYDRAMINE HCL 25 MG
25 CAPSULE ORAL EVERY 6 HOURS PRN
Status: DISCONTINUED | OUTPATIENT
Start: 2024-05-13 | End: 2024-05-14 | Stop reason: HOSPADM

## 2024-05-13 RX ORDER — GLYCOPYRROLATE 0.2 MG/ML
INJECTION, SOLUTION INTRAMUSCULAR; INTRAVENOUS PRN
Status: DISCONTINUED | OUTPATIENT
Start: 2024-05-13 | End: 2024-05-13

## 2024-05-13 RX ORDER — CEFAZOLIN SODIUM/WATER 2 G/20 ML
2 SYRINGE (ML) INTRAVENOUS SEE ADMIN INSTRUCTIONS
Status: DISCONTINUED | OUTPATIENT
Start: 2024-05-13 | End: 2024-05-13 | Stop reason: HOSPADM

## 2024-05-13 RX ORDER — FENTANYL CITRATE 0.05 MG/ML
25 INJECTION, SOLUTION INTRAMUSCULAR; INTRAVENOUS EVERY 5 MIN PRN
Status: DISCONTINUED | OUTPATIENT
Start: 2024-05-13 | End: 2024-05-13 | Stop reason: HOSPADM

## 2024-05-13 RX ORDER — TRANEXAMIC ACID 650 MG/1
1950 TABLET ORAL ONCE
Status: COMPLETED | OUTPATIENT
Start: 2024-05-13 | End: 2024-05-13

## 2024-05-13 RX ORDER — DEXAMETHASONE SODIUM PHOSPHATE 4 MG/ML
4 INJECTION, SOLUTION INTRA-ARTICULAR; INTRALESIONAL; INTRAMUSCULAR; INTRAVENOUS; SOFT TISSUE
Status: DISCONTINUED | OUTPATIENT
Start: 2024-05-13 | End: 2024-05-13 | Stop reason: HOSPADM

## 2024-05-13 RX ORDER — DEXMEDETOMIDINE HYDROCHLORIDE 4 UG/ML
INJECTION, SOLUTION INTRAVENOUS PRN
Status: DISCONTINUED | OUTPATIENT
Start: 2024-05-13 | End: 2024-05-13

## 2024-05-13 RX ORDER — BISACODYL 10 MG
10 SUPPOSITORY, RECTAL RECTAL DAILY PRN
Status: DISCONTINUED | OUTPATIENT
Start: 2024-05-16 | End: 2024-05-14 | Stop reason: HOSPADM

## 2024-05-13 RX ORDER — HYDROMORPHONE HCL IN WATER/PF 6 MG/30 ML
0.2 PATIENT CONTROLLED ANALGESIA SYRINGE INTRAVENOUS EVERY 5 MIN PRN
Status: DISCONTINUED | OUTPATIENT
Start: 2024-05-13 | End: 2024-05-13 | Stop reason: HOSPADM

## 2024-05-13 RX ORDER — ACETAMINOPHEN 325 MG/1
650 TABLET ORAL EVERY 4 HOURS PRN
Qty: 100 TABLET | Refills: 0 | Status: SHIPPED | OUTPATIENT
Start: 2024-05-13

## 2024-05-13 RX ORDER — CEFAZOLIN SODIUM 2 G/100ML
2 INJECTION, SOLUTION INTRAVENOUS EVERY 8 HOURS
Qty: 200 ML | Refills: 0 | Status: COMPLETED | OUTPATIENT
Start: 2024-05-13 | End: 2024-05-14

## 2024-05-13 RX ORDER — DEXAMETHASONE SODIUM PHOSPHATE 4 MG/ML
INJECTION, SOLUTION INTRA-ARTICULAR; INTRALESIONAL; INTRAMUSCULAR; INTRAVENOUS; SOFT TISSUE PRN
Status: DISCONTINUED | OUTPATIENT
Start: 2024-05-13 | End: 2024-05-13

## 2024-05-13 RX ORDER — ONDANSETRON 4 MG/1
4 TABLET, ORALLY DISINTEGRATING ORAL EVERY 6 HOURS PRN
Status: DISCONTINUED | OUTPATIENT
Start: 2024-05-13 | End: 2024-05-14 | Stop reason: HOSPADM

## 2024-05-13 RX ADMIN — SODIUM CHLORIDE, POTASSIUM CHLORIDE, SODIUM LACTATE AND CALCIUM CHLORIDE: 600; 310; 30; 20 INJECTION, SOLUTION INTRAVENOUS at 09:09

## 2024-05-13 RX ADMIN — SODIUM CHLORIDE, POTASSIUM CHLORIDE, SODIUM LACTATE AND CALCIUM CHLORIDE: 600; 310; 30; 20 INJECTION, SOLUTION INTRAVENOUS at 22:48

## 2024-05-13 RX ADMIN — PHENYLEPHRINE HYDROCHLORIDE 0.5 MCG/KG/MIN: 10 INJECTION INTRAVENOUS at 10:56

## 2024-05-13 RX ADMIN — FENTANYL CITRATE 50 MCG: 50 INJECTION, SOLUTION INTRAMUSCULAR; INTRAVENOUS at 13:03

## 2024-05-13 RX ADMIN — PHENYLEPHRINE HYDROCHLORIDE 150 MCG: 10 INJECTION INTRAVENOUS at 10:59

## 2024-05-13 RX ADMIN — HYDROMORPHONE HYDROCHLORIDE 0.2 MG: 0.2 INJECTION, SOLUTION INTRAMUSCULAR; INTRAVENOUS; SUBCUTANEOUS at 15:59

## 2024-05-13 RX ADMIN — HYDROMORPHONE HYDROCHLORIDE 0.4 MG: 0.2 INJECTION, SOLUTION INTRAMUSCULAR; INTRAVENOUS; SUBCUTANEOUS at 13:18

## 2024-05-13 RX ADMIN — PHENYLEPHRINE HYDROCHLORIDE 100 MCG: 10 INJECTION INTRAVENOUS at 10:43

## 2024-05-13 RX ADMIN — DEXTROAMPHETAMINE SACCHARATE, AMPHETAMINE ASPARTATE, DEXTROAMPHETAMINE SULFATE AND AMPHETAMINE SULFATE 20 MG: 5; 5; 5; 5 TABLET ORAL at 17:14

## 2024-05-13 RX ADMIN — ROCURONIUM BROMIDE 50 MG: 50 INJECTION, SOLUTION INTRAVENOUS at 10:19

## 2024-05-13 RX ADMIN — OXYCODONE HYDROCHLORIDE 10 MG: 5 TABLET ORAL at 20:35

## 2024-05-13 RX ADMIN — SENNOSIDES AND DOCUSATE SODIUM 1 TABLET: 50; 8.6 TABLET ORAL at 20:36

## 2024-05-13 RX ADMIN — PHENYLEPHRINE HYDROCHLORIDE 100 MCG: 10 INJECTION INTRAVENOUS at 10:49

## 2024-05-13 RX ADMIN — TRANEXAMIC ACID 1950 MG: 650 TABLET ORAL at 09:10

## 2024-05-13 RX ADMIN — SODIUM CHLORIDE, POTASSIUM CHLORIDE, SODIUM LACTATE AND CALCIUM CHLORIDE: 600; 310; 30; 20 INJECTION, SOLUTION INTRAVENOUS at 11:46

## 2024-05-13 RX ADMIN — ONDANSETRON 4 MG: 2 INJECTION INTRAMUSCULAR; INTRAVENOUS at 11:57

## 2024-05-13 RX ADMIN — Medication 10 MG: at 10:32

## 2024-05-13 RX ADMIN — DEXAMETHASONE SODIUM PHOSPHATE 10 MG: 4 INJECTION, SOLUTION INTRA-ARTICULAR; INTRALESIONAL; INTRAMUSCULAR; INTRAVENOUS; SOFT TISSUE at 10:15

## 2024-05-13 RX ADMIN — ACETAMINOPHEN 975 MG: 325 TABLET, FILM COATED ORAL at 16:00

## 2024-05-13 RX ADMIN — ACETAMINOPHEN 975 MG: 325 TABLET, FILM COATED ORAL at 22:08

## 2024-05-13 RX ADMIN — PHENYLEPHRINE HYDROCHLORIDE 100 MCG: 10 INJECTION INTRAVENOUS at 10:39

## 2024-05-13 RX ADMIN — DEXMEDETOMIDINE HYDROCHLORIDE 8 MCG: 200 INJECTION INTRAVENOUS at 11:52

## 2024-05-13 RX ADMIN — HYDROMORPHONE HYDROCHLORIDE 0.5 MG: 1 INJECTION, SOLUTION INTRAMUSCULAR; INTRAVENOUS; SUBCUTANEOUS at 11:28

## 2024-05-13 RX ADMIN — PROPOFOL 8 MG: 10 INJECTION, EMULSION INTRAVENOUS at 11:37

## 2024-05-13 RX ADMIN — FENTANYL CITRATE 25 MCG: 50 INJECTION, SOLUTION INTRAMUSCULAR; INTRAVENOUS at 09:30

## 2024-05-13 RX ADMIN — PROPOFOL 200 MG: 10 INJECTION, EMULSION INTRAVENOUS at 10:19

## 2024-05-13 RX ADMIN — MIDAZOLAM 2 MG: 1 INJECTION INTRAMUSCULAR; INTRAVENOUS at 10:15

## 2024-05-13 RX ADMIN — LIDOCAINE HYDROCHLORIDE 100 MG: 20 INJECTION, SOLUTION INFILTRATION; PERINEURAL at 10:19

## 2024-05-13 RX ADMIN — GLYCOPYRROLATE 0.2 MG: 0.2 INJECTION, SOLUTION INTRAMUSCULAR; INTRAVENOUS at 10:34

## 2024-05-13 RX ADMIN — ASPIRIN 325 MG: 325 TABLET, COATED ORAL at 16:00

## 2024-05-13 RX ADMIN — Medication 10 MG: at 10:29

## 2024-05-13 RX ADMIN — CEFAZOLIN SODIUM 2 G: 2 INJECTION, SOLUTION INTRAVENOUS at 18:23

## 2024-05-13 RX ADMIN — Medication 5 MG: at 10:34

## 2024-05-13 RX ADMIN — PHENYLEPHRINE HYDROCHLORIDE 100 MCG: 10 INJECTION INTRAVENOUS at 10:36

## 2024-05-13 RX ADMIN — PHENYLEPHRINE HYDROCHLORIDE 150 MCG: 10 INJECTION INTRAVENOUS at 10:54

## 2024-05-13 RX ADMIN — Medication 2 G: at 10:15

## 2024-05-13 RX ADMIN — SUGAMMADEX 200 MG: 100 INJECTION, SOLUTION INTRAVENOUS at 11:58

## 2024-05-13 RX ADMIN — FENTANYL CITRATE 100 MCG: 50 INJECTION INTRAMUSCULAR; INTRAVENOUS at 10:19

## 2024-05-13 RX ADMIN — HYDROMORPHONE HYDROCHLORIDE 0.5 MG: 1 INJECTION, SOLUTION INTRAMUSCULAR; INTRAVENOUS; SUBCUTANEOUS at 11:44

## 2024-05-13 RX ADMIN — FENTANYL CITRATE 50 MCG: 50 INJECTION, SOLUTION INTRAMUSCULAR; INTRAVENOUS at 12:46

## 2024-05-13 RX ADMIN — SODIUM CHLORIDE, POTASSIUM CHLORIDE, SODIUM LACTATE AND CALCIUM CHLORIDE: 600; 310; 30; 20 INJECTION, SOLUTION INTRAVENOUS at 14:38

## 2024-05-13 ASSESSMENT — ACTIVITIES OF DAILY LIVING (ADL)
ADLS_ACUITY_SCORE: 33
ADLS_ACUITY_SCORE: 22
ADLS_ACUITY_SCORE: 23
ADLS_ACUITY_SCORE: 22
ADLS_ACUITY_SCORE: 23
ADLS_ACUITY_SCORE: 25
ADLS_ACUITY_SCORE: 22
ADLS_ACUITY_SCORE: 35
ADLS_ACUITY_SCORE: 22
ADLS_ACUITY_SCORE: 25
ADLS_ACUITY_SCORE: 22
ADLS_ACUITY_SCORE: 23
ADLS_ACUITY_SCORE: 25

## 2024-05-13 ASSESSMENT — LIFESTYLE VARIABLES: TOBACCO_USE: 0

## 2024-05-13 NOTE — BRIEF OP NOTE
[facility]    Brief Operative Note    Pre-operative diagnosis: Unstable left total hip arthroplasty    Post-operative diagnosis Same as pre-operative diagnosis    Procedure:  Revision left total hip arthroplasty  Surgeon: Glen Rahman MD  Anesthesia: * No anesthesia type entered *   Estimated Blood Loss: 50 mL from 5/13/2024 12:00 AM to 5/13/2024 11:47 AM      Drains: None  Specimens: * No specimens in log *  Findings:   Components well fixed in good position.  No evidence for infection. Hip was quite unstable prior to procedure .  Complications: None.  Implants: * No implants in log *

## 2024-05-13 NOTE — ANESTHESIA CARE TRANSFER NOTE
Patient: Idris Woodson    Procedure: Procedure(s):  REVISION LEFT HIP TOTAL ARTHROPLASTY WITH EXCHANGE OF FEMORAL HEAD AND NECK COMPONENTS       Diagnosis: Dislocation of internal left hip prosthesis, subsequent encounter [T84.021D]  Diagnosis Additional Information: No value filed.    Anesthesia Type:   General     Note:    Oropharynx: oropharynx clear of all foreign objects  Level of Consciousness: drowsy  Oxygen Supplementation: face mask  Level of Supplemental Oxygen (L/min / FiO2): 10  Independent Airway: airway patency satisfactory and stable  Dentition: dentition unchanged  Vital Signs Stable: post-procedure vital signs reviewed and stable  Report to RN Given: handoff report given  Patient transferred to: PACU    Handoff Report: Identifed the Patient, Identified the Reponsible Provider, Reviewed the pertinent medical history, Discussed the surgical course, Reviewed Intra-OP anesthesia mangement and issues during anesthesia, Set expectations for post-procedure period and Allowed opportunity for questions and acknowledgement of understanding      Vitals:  Vitals Value Taken Time   /78 05/13/24 1209   Temp     Pulse 58 05/13/24 1212   Resp 9 05/13/24 1212   SpO2 98 % 05/13/24 1212   Vitals shown include unfiled device data.    Electronically Signed By: JULIENNE Bey CRNA  May 13, 2024  12:13 PM

## 2024-05-13 NOTE — ANESTHESIA POSTPROCEDURE EVALUATION
Patient: Idrsi Woodson    Procedure: Procedure(s):  REVISION LEFT HIP TOTAL ARTHROPLASTY WITH EXCHANGE OF FEMORAL HEAD AND NECK COMPONENTS       Anesthesia Type:  General    Note:  Disposition: Inpatient   Postop Pain Control: Uneventful            Sign Out: Well controlled pain   PONV: No   Neuro/Psych: Uneventful            Sign Out: Acceptable/Baseline neuro status   Airway/Respiratory: Uneventful            Sign Out: Acceptable/Baseline resp. status   CV/Hemodynamics: Uneventful            Sign Out: Acceptable CV status; No obvious hypovolemia; No obvious fluid overload   Other NRE: NONE   DID A NON-ROUTINE EVENT OCCUR? No       Last vitals:  Vitals Value Taken Time   /79 05/13/24 1416   Temp 36.8  C (98.2  F) 05/13/24 1330   Pulse 55 05/13/24 1416   Resp 16 05/13/24 1415   SpO2 98 % 05/13/24 1415   Vitals shown include unfiled device data.    Electronically Signed By: Bernard Trujillo MD  May 13, 2024  2:22 PM

## 2024-05-13 NOTE — ANESTHESIA PREPROCEDURE EVALUATION
"Prior to Admission medications    Medication Sig Start Date End Date Taking? Authorizing Provider   amLODIPine (NORVASC) 10 MG tablet  2/3/21  Yes Reported, Patient   amphetamine-dextroamphetamine (ADDERALL) 20 MG tablet  3/5/21  Yes Reported, Patient   citalopram (CELEXA) 40 MG tablet Take 40 mg by mouth daily 2/24/21 5/13/24 Yes Reported, Patient   lisinopril (ZESTRIL) 40 MG tablet TAKE 1 TABLET BY MOUTH EVERY DAY 6/15/20  Yes Reported, Patient   metoprolol succinate ER (TOPROL-XL) 25 MG 24 hr tablet TAKE 1 TABLET BY MOUTH EVERY DAY 5/19/20  Yes Reported, Patient     Current Facility-Administered Medications   Medication Dose Route Frequency Provider Last Rate Last Admin    ceFAZolin Sodium (ANCEF) injection 2 g  2 g Intravenous Pre-Op/Pre-procedure x 1 dose Glen Rahman MD        ceFAZolin Sodium (ANCEF) injection 2 g  2 g Intravenous See Admin Instructions Glen Rahman MD        lactated ringers infusion   Intravenous Continuous Gjerde, Niesha        lidocaine 1 % 0.1-1 mL  0.1-1 mL Other Q1H PRN Rosade, Niesha        tranexamic acid (LYSTEDA) tablet 1,950 mg  1,950 mg Oral Once Glen Rahman MD         Current Facility-Administered Medications   Medication Dose Route Frequency Provider Last Rate Last Admin    lactated ringers infusion   Intravenous Continuous Gjerde, Niesha         No results for input(s): \"ABO\", \"RH\" in the last 11825 hours.  No results for input(s): \"HCG\" in the last 67110 hours.  Recent Results (from the past 744 hour(s))   XR Pelvis and Hip Left 2 Views    Narrative    EXAM: XR PELVIS W LT LATERAL HIP  LOCATION: [facility]  DATE: 4/18/2024    INDICATION: L hip pain, L leg shortened and externally rotated.  COMPARISON: 02/20/2024.    IMPRESSION: Post left femoral acetabular arthroplasty. Prosthetic left femoral head is superiorly dislocated from the acetabular cup. No acute fracture visualized.   XR Hip Lt 1 View    Impression    COMPARISON:  04/18/2024    FINDINGS:  " The prosthetic left femoral head now appears centered over the acetabular cup on this single AP view examination. No evident fracture.    Anesthesia Pre-Procedure Evaluation    Patient: Idris Woodson   MRN: [id number] : 1965        Procedure : Procedure(s):  REVISION LEFT HIP TOTAL ARTHROPLASTY WITH EXCHANGE OF FEMORAL HEAD AND NECK COMPONENTS          Past Medical History:   Diagnosis Date    ADHD     Anxiety     Arthritis     Controlled diabetes mellitus (H)     Depressive disorder     Hearing loss     Hyperlipidemia LDL goal <100     Hypertension     Restless leg syndrome       Past Surgical History:   Procedure Laterality Date    CYST REMOVAL Right     leg    FINGER FRACTURE SURGERY Right     Right thumb    HEMORRHOIDECTOMY      INGUINAL HERNIA REPAIR      KNEE SURGERY Right 2008    ORTHOPEDIC SURGERY Left 2024    L hip    SHOULDER SURGERY Left 2014    WRIST SURGERY Left      (3 times)      Allergies   Allergen Reactions    Tramadol Itching and Other (See Comments)     Agitation and racing heart.        Social History     Tobacco Use    Smoking status: Never    Smokeless tobacco: Never   Substance Use Topics    Alcohol use: Yes     Comment: once or twice a week      Wt Readings from Last 1 Encounters:   24 94.5 kg (208 lb 6.4 oz)        Anesthesia Evaluation   Pt has had prior anesthetic.     No history of anesthetic complications       ROS/MED HX  ENT/Pulmonary:    (-) tobacco use   Neurologic: Comment: RLS      Cardiovascular:     (+) Dyslipidemia hypertension- -   -  - -                                      METS/Exercise Tolerance:     Hematologic:       Musculoskeletal: Comment: Recurrent post op hip dislocations Left hip  (+)  arthritis,             GI/Hepatic:       Renal/Genitourinary:       Endo:     (+)  type II DM,                 (-) Type I DM   Psychiatric/Substance Use:     (+) psychiatric history depression       Infectious Disease:       Malignancy:       Other:     "  (+)  , H/O Chronic Pain,         Physical Exam    Airway        Mallampati: I    Neck ROM: full     Respiratory Devices and Support         Dental       (+) Minor Abnormalities - some fillings, tiny chips      Cardiovascular   cardiovascular exam normal          Pulmonary   pulmonary exam normal                OUTSIDE LABS:  CBC:   Lab Results   Component Value Date    WBC 4.4 05/13/2024    HGB 12.7 (L) 05/13/2024    HCT 36.2 (L) 05/13/2024     05/13/2024     BMP: No results found for: \"NA\", \"POTASSIUM\", \"CHLORIDE\", \"CO2\", \"BUN\", \"CR\", \"GLC\"  COAGS: No results found for: \"PTT\", \"INR\", \"FIBR\"  POC: No results found for: \"BGM\", \"HCG\", \"HCGS\"  HEPATIC: No results found for: \"ALBUMIN\", \"PROTTOTAL\", \"ALT\", \"AST\", \"GGT\", \"ALKPHOS\", \"BILITOTAL\", \"BILIDIRECT\", \"SILVIA\"  OTHER: No results found for: \"PH\", \"LACT\", \"A1C\", \"AHSAN\", \"PHOS\", \"MAG\", \"LIPASE\", \"AMYLASE\", \"TSH\", \"T4\", \"T3\", \"CRP\", \"SED\"    Anesthesia Plan    ASA Status:  2    NPO Status:  NPO Appropriate    Anesthesia Type: General.     - Airway: ETT   Induction: Intravenous.   Maintenance: Balanced.   Techniques and Equipment:     - Airway: Video-Laryngoscope       Consents    Anesthesia Plan(s) and associated risks, benefits, and realistic alternatives discussed. Questions answered and patient/representative(s) expressed understanding.     - Discussed:     - Discussed with:  Patient            Postoperative Care    Pain management: IV analgesics.   PONV prophylaxis: Ondansetron (or other 5HT-3)     Comments:               Anton Ugalde MD    I have reviewed the pertinent notes and labs in the chart from the past 30 days and (re)examined the patient.  Any updates or changes from those notes are reflected in this note.              # Overweight: Estimated body mass index is 27.5 kg/m  as calculated from the following:    Height as of this encounter: 1.854 m (6' 1\").    Weight as of this encounter: 94.5 kg (208 lb 6.4 oz).      "

## 2024-05-13 NOTE — ANESTHESIA PROCEDURE NOTES
Airway         Procedure Start/Stop Times: 5/13/2024 10:21 AM  Staff -        Anesthesiologist:  Bernard Trujillo MD       CRNA: Pura Wyatt APRN CRNA       Performed By: CRNA  Consent for Airway        Urgency: elective  Indications and Patient Condition       Indications for airway management: hugo-procedural       Induction type:intravenous       Mask difficulty assessment: 1 - vent by mask    Final Airway Details       Final airway type: endotracheal airway       Successful airway: ETT - single  Endotracheal Airway Details        ETT size (mm): 8.0       Cuffed: yes       Cuff volume (mL): 10       Successful intubation technique: video laryngoscopy       VL Blade Size: Glidescope 4       Grade View of Cords: 1       Adjucts: stylet       Position: Right       Measured from: lips       Secured at (cm): 24       Bite block used: None    Post intubation assessment        Placement verified by: capnometry, equal breath sounds and chest rise        Number of attempts at approach: 1       Number of other approaches attempted: 0       Secured with: tape       Ease of procedure: easy       Dentition: Intact and Unchanged    Medication(s) Administered   Medication Administration Time: 5/13/2024 10:21 AM

## 2024-05-13 NOTE — PLAN OF CARE
Patient vital signs are at baseline: Yes  Patient able to ambulate as they were prior to admission or with assist devices provided by therapies during their stay:  No,  Reason:  pending PT  Patient MUST void prior to discharge:  No,  Reason:  DTV  Patient able to tolerate oral intake:  No,  Reason:  has not eaten yet  Pain has adequate pain control using Oral analgesics:  No,  Reason:  still progressing  Does patient have an identified :  Yes  Has goal D/C date and time been discussed with patient:  No,  Reason:  inpatient

## 2024-05-13 NOTE — H&P
[facility] History and Physical    Idris Woodson MRN# [id number]   Age: 59 year old YOB: 1965     Date of Admission:  5/13/2024    Home clinic: [facility]  Primary care provider: Nima Posey          Assessment and Plan:   Assessment:   Left hip instability status post a left total hip arthroplasty      Plan:   Revision left total hip arthroplasty               Chief Complaint:   Unstable left total hip arthroplasty     History is obtained from the patient and his chart.  Please refer to the H&P dated 3/7/2024 for more details.         History of Present Illness:   This patient is a 59 year old male who presents with the following condition requiring a hospital admission:    He is 2 months s/p a left total hip arthoplasty.  At the time of surgery, he was quite contracted, and we were not able to restore his leg length and offset as compared to his non-operative hip.  He did quite well until about a month out from surgery, when he suffered a posterior dislocation.  He has had multiple subluxation episodes since then.  His components appear well fixed and in good position, but he is definitely short compared to his other hip.  It appears that he has stretched out his contracted soft tissues, causing his hip to be unstable.  We discussed a revision of his left KAMERON to improve his stability, which will likely involve simply changing his neck component to increase his length and offset.  We would be ready to do more if need be, such as changing him over to a dual mobility articulation, but I don't think that will be necessary.  This was all discussed at length, the risks, benefits, and expected postoperative course.  He understood and wished to proceed.          Past Medical History:     Past Medical History:   Diagnosis Date    ADHD     Anxiety     Arthritis     Controlled diabetes mellitus (H)     Depressive disorder     Hearing loss     Hyperlipidemia LDL goal  <100     Hypertension     Restless leg syndrome             Past Surgical History:     Past Surgical History:   Procedure Laterality Date    CYST REMOVAL Right     leg    FINGER FRACTURE SURGERY Right     Right thumb    HEMORRHOIDECTOMY      INGUINAL HERNIA REPAIR      KNEE SURGERY Right 2008    ORTHOPEDIC SURGERY Left 03/21/2024    L hip    SHOULDER SURGERY Left 2014    WRIST SURGERY Left     2006 (3 times)            Social History:     Social History     Tobacco Use    Smoking status: Never    Smokeless tobacco: Never   Substance Use Topics    Alcohol use: Yes     Comment: once or twice a week            Family History:     Family History   Problem Relation Age of Onset    Substance Abuse Mother     Depression Mother     Substance Abuse Father     Substance Abuse Brother      Family history reviewed and updated in EPIC and not discussed         Immunizations:     Immunization History   Administered Date(s) Administered    COVID-19 MONOVALENT 12+ (Pfizer) 04/14/2021, 05/05/2021            Allergies:     Allergies   Allergen Reactions    Tramadol Itching and Other (See Comments)     Agitation and racing heart.              Medications:       Prior to Admission Medications   Prior to Admission Medications   Prescriptions Last Dose Informant Patient Reported? Taking?   amLODIPine (NORVASC) 10 MG tablet 5/13/2024  Yes Yes   amphetamine-dextroamphetamine (ADDERALL) 20 MG tablet 5/12/2024  Yes Yes   citalopram (CELEXA) 40 MG tablet 5/13/2024  Yes Yes   Sig: Take 40 mg by mouth daily   lisinopril (ZESTRIL) 40 MG tablet 5/13/2024  Yes Yes   Sig: TAKE 1 TABLET BY MOUTH EVERY DAY   metoprolol succinate ER (TOPROL-XL) 25 MG 24 hr tablet 5/13/2024  Yes Yes   Sig: TAKE 1 TABLET BY MOUTH EVERY DAY      Facility-Administered Medications: None             Review of Systems:   The Review of Systems is negative other than noted in the HPI         Physical Exam:   Temp: 96.9  F (36.1  C) Temp src: Temporal BP: (!) 151/86 Pulse: 54    Resp: 12 SpO2: 96 % O2 Device: None (Room air)    All vitals have been reviewed  Left hip wound well healed without evidence for infection  Left lower extremity is NVI distally         Data:     Results for orders placed or performed during the hospital encounter of 05/13/24 (from the past 24 hour(s))   CBC with platelets   Result Value Ref Range    WBC Count 4.4 4.0 - 11.0 10e3/uL    RBC Count 3.94 (L) 4.40 - 5.90 10e6/uL    Hemoglobin 12.7 (L) 13.3 - 17.7 g/dL    Hematocrit 36.2 (L) 40.0 - 53.0 %    MCV 92 78 - 100 fL    MCH 32.2 26.5 - 33.0 pg    MCHC 35.1 31.5 - 36.5 g/dL    RDW 12.2 10.0 - 15.0 %    Platelet Count 255 150 - 450 10e3/uL   ABO/Rh type and screen    Narrative    The following orders were created for panel order ABO/Rh type and screen.  Procedure                               Abnormality         Status                     ---------                               -----------         ------                     Adult Type and Screen[[id number]]                            Final result                 Please view results for these tests on the individual orders.   Potassium   Result Value Ref Range    Potassium 3.2 (L) 3.4 - 5.3 mmol/L   Glucose   Result Value Ref Range    Glucose 139 (H) 70 - 99 mg/dL   Adult Type and Screen   Result Value Ref Range    ABO/RH(D) B POS     Antibody Screen Negative Negative    SPECIMEN EXPIRATION DATE [id number]           Attestation:  I have reviewed today's vital signs, notes, medications, labs and imaging.    Glen Rahman MD

## 2024-05-13 NOTE — ANESTHESIA PROCEDURE NOTES
Airway         Procedure Start/Stop Times: 5/13/2024 10:21 AM  Staff -        CRNA: Warner Calixto APRN CRNA       Performed By: CRNA  Consent for Airway        Urgency: elective      Medication(s) Administered   Medication Administration Time: 5/13/2024 10:21 AM

## 2024-05-13 NOTE — OP NOTE
Procedure Date: 05/13/24     PREOPERATIVE DIAGNOSIS:  Unstable left total hip arthroplasty     POSTOPERATIVE DIAGNOSIS:   Unstable left total hip arthroplasty    PROCEDURE:  Revision left total hip arthroplasty, with placement of new femoral head and neck components.     SURGEON:  Glen Rahman MD.     FIRST ASSISTANT:  LUCA Wang.     SECOND ASSISTANT:  Leora Barone ATC.    INDICATIONS:  The patient is a 59 year old gentleman who is now approximately one month status post a left direct anterior total hip arthroplasty.  At the time of surgery, his femoral head had eroded the superior rim of the acetabulum and was subluxed proximally to a significant degree, enough so, that the soft tissues were quite contracted.  Despite our efforts, we were unable to place a long enough neck component to restore his normal length and offset.  Because of the contractures, he was quite stable at the end of the procedure.  He did well for about a month, and then he suffered a posterior dislocation.  He has been having recurrent subluxations since then.  His components appear well-fixed and in good position.  It appeared that he had stretched out his contracted soft tissues, and now there was not enough soft tissue tension to maintain joint stability.  We talked about revision surgery for this, which we anticipated would involve simply exchanging his original neck for a longer one to achieve satisfactory soft tissue tension.  He understood and agreed with the procedure.     PROCEDURE IN DETAIL:  The patient was brought to the operating room and given a general anesthetic.  The patient was placed supine on the radiolucent operating table, and their left hip and left lower extremity were then prepped and draped in sterile fashion.  We could feel the hip sublux anteriorly with just slight external rotation of the hip.  An incision was made over the anterior aspect of the hip through the scar from his previous surgery.  This  was carried down through subcutaneous tissues, down to the fascia.  The fascia was incised longitudinally, and we were able to identify the interval between the tensor fascia zuleyka and sartorius, which was developed deep. We entered the joint, and there was a moderate amount of brownish fluid, as expected.  There was nothing to suggest infection.  We were able to easily dislocate the hip and then remove the femoral head component, followed by the femoral neck component.  The acetabular and femoral stem components were then carefully inspected and were found to be well fixed and in proper position.  We then trialed the hip with various neck length and offset options, and we ultimately decided to go with the extra extended offset, +8 neck, which was quite a difference from the original neck, which was a standard offset, +0 neck.  The extra extended offset, +8 neck restored the length and offset very nicely, and the hip was quite stable.  A real extra extended offset, +8 neck with a new 36, +0 ceramic head were then impacted onto the stem.  The hip was quite stable through full range of motion and even aggressive extremes of motion.  The wound was then irrigated, first, with a dilute solution of Betadine, which was allowed to sit within the wound for 3 minutes and then was thoroughly irrigated from the wound with a liter of normal saline.  We then sprinkled a gram of vancomycin powder into the wound.  The fascia was closed with some interrupted 0 Vicryl sutures, followed by running #1 Vicryl suture.  The skin was closed with 2-0 Vicryl subcutaneous sutures and a 3-0 Stratafix running subcuticular suture and Steri-Strips. A sterile dressing was applied to the wound.  The patient was then awakened from anesthesia and transferred to postanesthesia recovery in satisfactory condition.     It should be noted that my assistants were necessary throughout the entire procedure to assist with retraction and positioning.      Glen Rahman MD

## 2024-05-14 ENCOUNTER — APPOINTMENT (OUTPATIENT)
Dept: PHYSICAL THERAPY | Facility: CLINIC | Age: 59
End: 2024-05-14
Attending: ORTHOPAEDIC SURGERY
Payer: COMMERCIAL

## 2024-05-14 ENCOUNTER — APPOINTMENT (OUTPATIENT)
Dept: OCCUPATIONAL THERAPY | Facility: CLINIC | Age: 59
End: 2024-05-14
Attending: ORTHOPAEDIC SURGERY
Payer: COMMERCIAL

## 2024-05-14 VITALS
SYSTOLIC BLOOD PRESSURE: 142 MMHG | HEIGHT: 73 IN | OXYGEN SATURATION: 97 % | WEIGHT: 208.4 LBS | HEART RATE: 69 BPM | RESPIRATION RATE: 18 BRPM | TEMPERATURE: 98.1 F | BODY MASS INDEX: 27.62 KG/M2 | DIASTOLIC BLOOD PRESSURE: 78 MMHG

## 2024-05-14 LAB
HGB BLD-MCNC: 12.4 G/DL (ref 13.3–17.7)
POTASSIUM SERPL-SCNC: 3.6 MMOL/L (ref 3.4–5.3)

## 2024-05-14 PROCEDURE — 97110 THERAPEUTIC EXERCISES: CPT | Mod: GP | Performed by: PHYSICAL THERAPIST

## 2024-05-14 PROCEDURE — 250N000013 HC RX MED GY IP 250 OP 250 PS 637: Performed by: ORTHOPAEDIC SURGERY

## 2024-05-14 PROCEDURE — 250N000011 HC RX IP 250 OP 636: Performed by: ORTHOPAEDIC SURGERY

## 2024-05-14 PROCEDURE — 97161 PT EVAL LOW COMPLEX 20 MIN: CPT | Mod: GP | Performed by: PHYSICAL THERAPIST

## 2024-05-14 PROCEDURE — 85018 HEMOGLOBIN: CPT | Performed by: ORTHOPAEDIC SURGERY

## 2024-05-14 PROCEDURE — 97116 GAIT TRAINING THERAPY: CPT | Mod: GP | Performed by: PHYSICAL THERAPIST

## 2024-05-14 PROCEDURE — 97165 OT EVAL LOW COMPLEX 30 MIN: CPT | Mod: GO

## 2024-05-14 PROCEDURE — 36415 COLL VENOUS BLD VENIPUNCTURE: CPT | Performed by: ORTHOPAEDIC SURGERY

## 2024-05-14 PROCEDURE — 97535 SELF CARE MNGMENT TRAINING: CPT | Mod: GO

## 2024-05-14 PROCEDURE — 84132 ASSAY OF SERUM POTASSIUM: CPT | Performed by: ORTHOPAEDIC SURGERY

## 2024-05-14 RX ADMIN — SENNOSIDES AND DOCUSATE SODIUM 1 TABLET: 50; 8.6 TABLET ORAL at 09:58

## 2024-05-14 RX ADMIN — ACETAMINOPHEN 975 MG: 325 TABLET, FILM COATED ORAL at 15:32

## 2024-05-14 RX ADMIN — DEXTROAMPHETAMINE SACCHARATE, AMPHETAMINE ASPARTATE, DEXTROAMPHETAMINE SULFATE AND AMPHETAMINE SULFATE 20 MG: 5; 5; 5; 5 TABLET ORAL at 09:58

## 2024-05-14 RX ADMIN — OXYCODONE HYDROCHLORIDE 10 MG: 5 TABLET ORAL at 09:59

## 2024-05-14 RX ADMIN — ACETAMINOPHEN 975 MG: 325 TABLET, FILM COATED ORAL at 06:01

## 2024-05-14 RX ADMIN — POLYETHYLENE GLYCOL 3350 17 G: 17 POWDER, FOR SOLUTION ORAL at 09:59

## 2024-05-14 RX ADMIN — DIPHENHYDRAMINE HYDROCHLORIDE 25 MG: 25 CAPSULE ORAL at 15:36

## 2024-05-14 RX ADMIN — CITALOPRAM HYDROBROMIDE 40 MG: 10 TABLET ORAL at 09:58

## 2024-05-14 RX ADMIN — DEXTROAMPHETAMINE SACCHARATE, AMPHETAMINE ASPARTATE, DEXTROAMPHETAMINE SULFATE AND AMPHETAMINE SULFATE 20 MG: 5; 5; 5; 5 TABLET ORAL at 15:32

## 2024-05-14 RX ADMIN — AMLODIPINE BESYLATE 10 MG: 10 TABLET ORAL at 09:58

## 2024-05-14 RX ADMIN — OXYCODONE HYDROCHLORIDE 10 MG: 5 TABLET ORAL at 03:02

## 2024-05-14 RX ADMIN — OXYCODONE HYDROCHLORIDE 10 MG: 5 TABLET ORAL at 15:32

## 2024-05-14 RX ADMIN — METOPROLOL SUCCINATE 25 MG: 25 TABLET, EXTENDED RELEASE ORAL at 09:58

## 2024-05-14 RX ADMIN — LISINOPRIL 40 MG: 40 TABLET ORAL at 09:58

## 2024-05-14 RX ADMIN — CEFAZOLIN SODIUM 2 G: 2 INJECTION, SOLUTION INTRAVENOUS at 02:17

## 2024-05-14 RX ADMIN — ASPIRIN 325 MG: 325 TABLET, COATED ORAL at 09:59

## 2024-05-14 ASSESSMENT — ACTIVITIES OF DAILY LIVING (ADL)
ADLS_ACUITY_SCORE: 28
ADLS_ACUITY_SCORE: 28
ADLS_ACUITY_SCORE: 25
ADLS_ACUITY_SCORE: 28
ADLS_ACUITY_SCORE: 25
ADLS_ACUITY_SCORE: 28
ADLS_ACUITY_SCORE: 28
IADL_COMMENTS: PT REPORTS BEING IND W/ ALL IADL'S AT BASELINE PRIOR TO ADMISSION. PT REPORTS THAT THEY STILL DRIVE.
ADLS_ACUITY_SCORE: 28
PREVIOUS_RESPONSIBILITIES: MEAL PREP;HOUSEKEEPING;LAUNDRY;MEDICATION MANAGEMENT;FINANCES;DRIVING
ADLS_ACUITY_SCORE: 28
ADLS_ACUITY_SCORE: 25
ADLS_ACUITY_SCORE: 25

## 2024-05-14 NOTE — PROGRESS NOTES
05/14/24 0900   Appointment Info   Signing Clinician's Name / Credentials (OT) Yasir Maldonado OTR/L   Living Environment   People in Home spouse   Current Living Arrangements house  ([facility])   Home Accessibility stairs to enter home;stairs within home   Number of Stairs, Main Entrance none   Stair Railings, Main Entrance none   Number of Stairs, Within Home, Primary greater than 10 stairs  (18)   Stair Railings, Within Home, Primary none   Transportation Anticipated car, drives self;family or friend will provide   Living Environment Comments Pt reports living in [facility] w/ spouse and daughter. Bed/bath on upper level. Walk in shower.   Self-Care   Usual Activity Tolerance good   Current Activity Tolerance moderate   Equipment Currently Used at Home none   Fall history within last six months no   Activity/Exercise/Self-Care Comment Pt reports being IND w/ all ADL's at baseline prior to admission.   Instrumental Activities of Daily Living (IADL)   Previous Responsibilities meal prep;housekeeping;laundry;medication management;finances;driving   IADL Comments Pt reports being IND w/ all IADL's at baseline prior to admission. Pt reports that they still drive.   General Information   Onset of Illness/Injury or Date of Surgery 05/13/24   Referring Physician Glen Rahman MD   Patient/Family Therapy Goal Statement (OT) Return to home.   Additional Occupational Profile Info/Pertinent History of Current Problem Pt here for Left hip instability status post a left total hip arthroplasty; POD #1   Existing Precautions/Restrictions fall;weight bearing;no hip IR;no hip ER;90 degree hip flexion;no pivoting or twisting;no hip ADD past midline   Left Lower Extremity (Weight-bearing Status) weight-bearing as tolerated (WBAT)   Cognitive Status Examination   Orientation Status orientation to person, place and time   Visual Perception   Visual Impairment/Limitations corrective lenses full-time   Sensory   Sensory Quick Adds  sensation intact   Pain Assessment   Patient Currently in Pain No   Range of Motion Comprehensive   General Range of Motion no range of motion deficits identified   Strength Comprehensive (MMT)   General Manual Muscle Testing (MMT) Assessment no strength deficits identified   Bed Mobility   Bed Mobility sit-supine   Sit-Supine Menard (Bed Mobility) supervision   Comment (Bed Mobility) Pt up in chair on arrival   Transfers   Transfers sit-stand transfer;toilet transfer   Sit-Stand Transfer   Sit-Stand Menard (Transfers) supervision   Assistive Device (Sit-Stand Transfers) walker, front-wheeled   Toilet Transfer   Type (Toilet Transfer) stand-sit;sit-stand   Menard Level (Toilet Transfer) supervision   Assistive Device (Toilet Transfer) walker, front-wheeled   Activities of Daily Living   BADL Assessment/Intervention upper body dressing;lower body dressing;grooming;toileting   Upper Body Dressing Assessment/Training   Menard Level (Upper Body Dressing) independent   Lower Body Dressing Assessment/Training   Assistive Devices (Lower Body Dressing) reacher;sock-aid   Menard Level (Lower Body Dressing) modified independence   Grooming Assessment/Training   Position (Grooming) sink side   Menard Level (Grooming) modified independence   Toileting   Menard Level (Toileting) modified independence   Clinical Impression   Criteria for Skilled Therapeutic Interventions Met (OT) Yes, treatment indicated   OT Diagnosis Decreased ADL independence and activity tolerance   Influenced by the following impairments L KAMERON   OT Problem List-Impairments impacting ADL problems related to;activity tolerance impaired;post-surgical precautions   Assessment of Occupational Performance 1-3 Performance Deficits   Identified Performance Deficits home mgmt, LB dressing   Planned Therapy Interventions (OT) ADL retraining;home program guidelines;progressive activity/exercise;risk factor education   Clinical  Decision Making Complexity (OT) problem focused assessment/low complexity   Risk & Benefits of therapy have been explained evaluation/treatment results reviewed;care plan/treatment goals reviewed;risks/benefits reviewed;current/potential barriers reviewed;patient   OT Total Evaluation Time   OT Eval, Low Complexity Minutes (15705) 10   OT Goals   Therapy Frequency (OT) One time eval and treatment   OT Predicted Duration/Target Date for Goal Attainment 05/14/24   OT Goals Hygiene/Grooming;Upper Body Dressing;Lower Body Dressing;Toilet Transfer/Toileting   OT: Hygiene/Grooming modified independent;while standing;Goal Met   OT: Upper Body Dressing Independent;including set-up/clothing retrieval;Goal Met   OT: Lower Body Dressing Modified independent;using adaptive equipment;within precautions;including set-up/clothing retrieval;Goal Met   OT: Toilet Transfer/Toileting Modified independent;toilet transfer;Goal Met   Self-Care/Home Management   Self-Care/Home Mgmt/ADL, Compensatory, Meal Prep Minutes (99347) 23   Symptoms Noted During/After Treatment (Meal Preparation/Planning Training) fatigue   Treatment Detail/Skilled Intervention Pt greeted sitting up in bedside chair following PT session and agreeable for OT evaluation. Initial time spent reviewing weight bearing status and hip precautions w/ LLE; pt verbalized understanding. Pt reporting no pain while at rest prior to activity. Supervision sit > stand edge of chair w/ use of FWW. Pt ambulated within room to/from bathroom and completed toilet transfer w/ supervision and use of grab bar on R side. Pt reports having no sink or grab bar next to toilet at home; completed additional toilet trasnfer without use of grab bar and demonstrated no difficulty. Following completion, pt stood sinkside w/ education on safe walker placement and completed 1 g/h task w/ Mod IND. Pt then returned to room and seated in bedside chair w/ cues to reach back to arm rest to control decent  into chair. Educated pt on safe LB dressing techniques as well as AE (reacher, sock aid) that can be used to assist in completing while maintaining precautions. Weriter demonstrated safe use of AE and pt was able to complete all LB dressing w/ Mod IND while maintaining precautions w/ 1 VC; additional sit > stand completed to allow for pt to don underwear/pants over hips. Pt returned seated and completed UB dressing IND. Educated pt on where to purchase equipment for home and pt was appreciative. Additional time spent reviewing home safety precautions and pt verbalized understanding. Pt then requesting to return to bed; SBA to ambulate back to bed and pt completed sit > sup w/ supervision. Pt remained supine in bed w/ all needs met and bed alarm activated.   OT Discharge Planning   OT Plan Discharge OT   OT Discharge Recommendation (DC Rec)   (Defer to Ortho)   OT Rationale for DC Rec Pt is currently functioning slightly below baseline as expected following L KAMERON. Pt lives in house w/ spouse and daughter and reports being IND w/ all I/ADL's at baseline. Anticipate pt will be safe to d/c home w/ assist from family as needed.   OT Brief overview of current status See above   Total Session Time   Timed Code Treatment Minutes 23   Total Session Time (sum of timed and untimed services) 33

## 2024-05-14 NOTE — PROGRESS NOTES
Patient vital signs are at baseline: Yes  Patient able to ambulate as they were prior to admission or with assist devices provided by therapies during their stay:  Yes  Patient MUST void prior to discharge:  Yes  Patient able to tolerate oral intake:  Yes  Pain has adequate pain control using Oral analgesics:  Yes  Does patient have an identified :  Yes  Has goal D/C date and time been discussed with patient:  Yes  Pt. A&Ox4, cms intact, vss, up with sba, voiding per urinal, dressing CDI, discharge instruction reviwed with pt. Discharge medication given to the pt. Belonging returned, questions answered, and pt. Discharge to home with wife at 18:50.

## 2024-05-14 NOTE — PLAN OF CARE
Occupational Therapy Discharge Summary    Reason for therapy discharge:    All goals and outcomes met, no further needs identified.    Progress towards therapy goal(s). See goals on Care Plan in [facility] electronic health record for goal details.  Goals met    Therapy recommendation(s):    No further therapy is recommended. Pt is currently functioning slightly below baseline as expected following L KAMERON. Pt lives in house w/ spouse and daughter and reports being IND w/ all I/ADL's at baseline. Anticipate pt will be safe to d/c home w/ assist from family as needed.

## 2024-05-14 NOTE — PLAN OF CARE
Diagnosis: Revision of left hip total Arthroplasty.  POD#: 0  Mental Status: A&OX4.  Activity/dangle Not OOB yet.  Diet: Regular Diet.  Pain: Pain managed with Dilaudid x1, Oxycodone x1, Tylenol.  Galan/Voiding: Urinal.  Tele/Restraints/Iso: No.  02/LDA: PIV LR infusing 125ml/hr. VSS in room air.  D/C Date: Pending.  Other Info: CMS intact, Left hip dressing clean dry and intact. Pt reschedule tomorrow.

## 2024-05-14 NOTE — PROGRESS NOTES
05/14/24 0831   Appointment Info   Signing Clinician's Name / Credentials (PT) Edie Acosta DPT   Living Environment   People in Home spouse   Current Living Arrangements house  (Townhouse)   Home Accessibility stairs to enter home;stairs within home   Number of Stairs, Main Entrance none   Stair Railings, Main Entrance none   Number of Stairs, Within Home, Primary greater than 10 stairs  (18)   Stair Railings, Within Home, Primary none   Transportation Anticipated car, drives self;family or friend will provide   Living Environment Comments Pt reports spouse will be able to assist at discharge.   Self-Care   Usual Activity Tolerance good   Current Activity Tolerance moderate   Equipment Currently Used at Home none   Fall history within last six months no   Activity/Exercise/Self-Care Comment Pt owns FWW and SEC.   General Information   Onset of Illness/Injury or Date of Surgery 05/13/24   Referring Physician Glen Rahman MD   Patient/Family Therapy Goals Statement (PT) Return home.   Pertinent History of Current Problem (include personal factors and/or comorbidities that impact the POC) 60 y/o male POD # 1 revision of L KAMERON with placement of new femoral head and neck components.   Existing Precautions/Restrictions fall;no hip ER;no hip IR;no hip ADD past midline;no hip hyperextension;90 degree hip flexion;no pivoting or twisting   Weight-Bearing Status - LLE weight-bearing as tolerated   General Observations Pt in supine upon arrival of therapist.   Cognition   Affect/Mental Status (Cognition) WFL   Orientation Status (Cognition) oriented x 3   Follows Commands (Cognition) WFL   Pain Assessment   Patient Currently in Pain   (L hip pain at rest: 4/10)   Integumentary/Edema   Integumentary/Edema Comments L hip incision covered with dressing.   Posture    Posture Comments Noted forward head and shoulder posture sitting EOB and standing at FWW.   Range of Motion (ROM)   ROM Comment Limited L hip ROM  d/t hip precautions and pain, otherwise B LEs WFL.   Strength (Manual Muscle Testing)   Strength Comments Pt demonstrates at least 3/5 grossly in B LEs with functional mobility.   Bed Mobility   Comment, (Bed Mobility) Supine-sit with SBA.   Transfers   Comment, (Transfers) Sit <> stand with FWW and SBA.   Gait/Stairs (Locomotion)   Comment, (Gait/Stairs) Pt amb 10' with FWW and SBA.   Balance   Balance Comments Noted good seated and standing balance at FWW.   Sensory Examination   Sensory Perception Comments Pt denies numbness/tingling in B LEs.   Clinical Impression   Criteria for Skilled Therapeutic Intervention Yes, treatment indicated   PT Diagnosis (PT) Difficulty with functional mobility.   Influenced by the following impairments Pain, Generalized weakness, Decreased activity tolerance, Hip Precautions   Functional limitations due to impairments Limited functional mobility requriring AD and assist.   Clinical Presentation (PT Evaluation Complexity) stable   Clinical Presentation Rationale Based on PMH, current presentation, and social support.   Clinical Decision Making (Complexity) low complexity   Planned Therapy Interventions (PT) bed mobility training;cryotherapy;gait training;ROM (range of motion);stair training;strengthening;transfer training   Risk & Benefits of therapy have been explained patient   PT Total Evaluation Time   PT Eval, Low Complexity Minutes (38870) 10   Physical Therapy Goals   PT Frequency One time eval and treatment only   PT Predicted Duration/Target Date for Goal Attainment 05/14/24   PT Goals Bed Mobility;Transfers;Gait;Stairs   PT: Bed Mobility Supervision/stand-by assist;Supine to/from sit;Within precautions   PT: Transfers Supervision/stand-by assist;Sit to/from stand;Assistive device;Within precautions   PT: Gait Supervision/stand-by assist;Rolling walker;Within precautions;100 feet   PT: Stairs Supervision/stand-by assist;3 stairs;Rail on left;Within precautions;Assistive  device   Interventions   Interventions Quick Adds Gait Training;Therapeutic Activity;Therapeutic Procedure   Therapeutic Procedure/Exercise   Ther. Procedure: strength, endurance, ROM, flexibillity Minutes (93322) 8   Symptoms Noted During/After Treatment fatigue;increased pain   Treatment Detail/Skilled Intervention PT: Pt performed KAMERON exercises in recliner chair x 10 repetitions with cues for technique: ankle pumps, glut set, quad set, heel slides and SAQ. Provided pt with HEP handout.   Therapeutic Activity   Therapeutic Activities: dynamic activities to improve functional performance Minutes (62132) 4   Symptoms Noted During/After Treatment Fatigue;Increased pain   Treatment Detail/Skilled Intervention PT: Educated pt on anterior and posterior hip precautions, provided pt with handout. Pt performed supine-sit with SBA, cues provided to maintain hip precautions. Sit <> stand with FWW and SBA, cues provided for hand placement and upright posture upon standing. Pt sitting up in chair at end of session, chair alarm on and needs within reach.   Gait Training   Gait Training Minutes (61378) 8   Symptoms Noted During/After Treatment (Gait Training) fatigue;increased pain   Treatment Detail/Skilled Intervention PT: Gait training of 100' x 2 with FWW and SBA, frequent cues provided for keeping FWW close to body and maintaining hip precautions. Pt navigated 3 stairs x 2 with 1 rail and SEC, SBA and cues provided for sequencing.   Distance in Feet 100' x 2   PT Discharge Planning   PT Plan Disch   PT Rationale for DC Rec Pt is progressing towards independence, currently requiring SBA with use of FWW. Pt requires cues to maintain hip precautions. Recommend pt utilize FWW for transfers and ambulation, SEC to navigate stairs.   PT Brief overview of current status SBA with FWW, SEC to navigate stairs   Total Session Time   Timed Code Treatment Minutes 20   Total Session Time (sum of timed and untimed services) 30     Physical  Therapy Discharge Summary    Reason for therapy discharge:    Discharged to home.    Progress towards therapy goal(s). See goals on Care Plan in [facility] electronic health record for goal details.  Goals met    Therapy recommendation(s):    Continue home exercise program.

## 2024-05-14 NOTE — PROGRESS NOTES
"POD# 1    SUBJECTIVE  Pain well managed  Mobility improving    OBJECTIVE:   BP (!) 144/79 (BP Location: Right arm)   Pulse 58   Temp 98.1  F (36.7  C) (Oral)   Resp 16   Ht 1.854 m (6' 1\")   Wt 94.5 kg (208 lb 6.4 oz)   SpO2 96%   BMI 27.50 kg/m     Hemoglobin   Date Value Ref Range Status   05/13/2024 12.7 (L) 13.3 - 17.7 g/dL Final   ]   Wound: Dressing dry  Hgb pending    ASSESSMENT   Stable    PLAN   Mobilize in PT  Maintain anterior and posterior hip precautions  Home later today  Follow up 2 weeks post-op    Glen Rahman MD   "

## 2024-05-14 NOTE — PLAN OF CARE
Diagnosis: Revision L hip with exchange of femoral head and neck components  POD#: 1  Mental Status: A&Ox4  Activity/dangle up 1 Gb walker  Diet: regular  Pain: oxycodone  Galan/Voiding: urinal  02/LDA: Ra. Elmo SL  D/C Date: pending  Other Info: dressing CDI. Cms intact

## 2024-05-15 NOTE — DISCHARGE SUMMARY
[facility] Discharge Summary    Idris Woodson MRN# [id number]   Age: 59 year old YOB: 1965     Date of Admission:  5/13/2024  Date of Discharge::  5/14/2024  6:50 PM  Admitting Physician:  Glen Rahman MD  Discharge Physician:  Glen Rahman MD     Home clinic: [facility]          Admission Diagnoses:   Unstable left total hip arthroplasty          Discharge Diagnosis:   Status post revision left total hip arthroplasty          Procedures:   Procedure(s): Revision left total hip arthroplasty with placement of a new femoral neck and femoral head component       No other procedures performed during this admission           Medications Prior to Admission:       Prior to Admission Medications   Prior to Admission Medications   Prescriptions Last Dose Informant Patient Reported? Taking?   amLODIPine (NORVASC) 10 MG tablet 5/13/2024  Yes Yes   amphetamine-dextroamphetamine (ADDERALL) 20 MG tablet 5/12/2024  Yes Yes   citalopram (CELEXA) 40 MG tablet 5/13/2024  Yes Yes   Sig: Take 40 mg by mouth daily   lisinopril (ZESTRIL) 40 MG tablet 5/13/2024  Yes Yes   Sig: TAKE 1 TABLET BY MOUTH EVERY DAY   metoprolol succinate ER (TOPROL-XL) 25 MG 24 hr tablet 5/13/2024  Yes Yes   Sig: TAKE 1 TABLET BY MOUTH EVERY DAY      Facility-Administered Medications: None             Discharge Medications:     Discharge Medication List as of 5/14/2024  5:57 PM      START taking these medications    Details   acetaminophen (TYLENOL) 325 MG tablet Take 2 tablets (650 mg) by mouth every 4 hours as needed for other (mild pain), Disp-100 tablet, R-0, E-Prescribe      aspirin (ASA) 325 MG EC tablet Take 1 tablet (325 mg) by mouth daily, Disp-30 tablet, R-0, E-Prescribe      hydrOXYzine HCl (ATARAX) 25 MG tablet Take 1 tablet (25 mg) by mouth every 6 hours as needed for itching or anxiety (with pain, moderate pain), Disp-30 tablet, R-0, E-Prescribe      oxyCODONE (ROXICODONE) 5 MG tablet Take  1-2 tablets (5-10 mg) by mouth every 4 hours as needed for moderate to severe pain, Disp-25 tablet, R-0, E-Prescribe      senna-docusate (SENOKOT-S/PERICOLACE) 8.6-50 MG tablet Take 1-2 tablets by mouth 2 times daily Take while on oral narcotics to prevent or treat constipation., Disp-30 tablet, R-0, E-PrescribeWhile taking narcotics         CONTINUE these medications which have NOT CHANGED    Details   amLODIPine (NORVASC) 10 MG tablet Historical      amphetamine-dextroamphetamine (ADDERALL) 20 MG tablet Historical      citalopram (CELEXA) 40 MG tablet Take 40 mg by mouth daily, Historical      lisinopril (ZESTRIL) 40 MG tablet TAKE 1 TABLET BY MOUTH EVERY DAY, Historical      metoprolol succinate ER (TOPROL-XL) 25 MG 24 hr tablet TAKE 1 TABLET BY MOUTH EVERY DAY, Historical                   Consultations:   No consultations were requested during this admission          Brief History of Illness:   The patient is 2 months s/p a left total hip arthoplasty.  At the time of surgery, he was quite contracted, and we were not able to restore his leg length and offset as compared to his non-operative hip.  He did quite well until about a month out from surgery, when he suffered a posterior dislocation.  He has had multiple subluxation episodes since then.  His components appear well fixed and in good position, but he is definitely short compared to his other hip.  It appears that he has stretched out his contracted soft tissues, causing his hip to be unstable.  We discussed a revision of his left KAMERON to improve his stability, which would likely involve simply changing his neck component to increase his length and offset.  We would be ready to do more if need be, such as changing him over to a dual mobility articulation, but I don't think that will be necessary.  This was all discussed at length, the risks, benefits, and expected postoperative course.  He understood and wished to proceed.           Hospital Course:   The  patient's hospital course was unremarkable.  He recovered as anticipated and experienced no post-operative complications.           Discharge Instructions and Follow-Up:   Discharge diet: Regular   Discharge activity: Activity as tolerated, with hip precautions   Discharge follow-up: Follow up with Dr. Glen Rahman in 10-14 days days   Wound care: Keep Aquacel dressing in place.  Okay to shower.           Discharge Disposition:   Discharged to home      Attestation:  I have reviewed today's vital signs, notes, medications, labs and imaging.    Glen Rahman MD

## 2025-01-04 ENCOUNTER — HEALTH MAINTENANCE LETTER (OUTPATIENT)
Age: 60
End: 2025-01-04

## 2025-01-21 ENCOUNTER — MEDICAL CORRESPONDENCE (OUTPATIENT)
Dept: HEALTH INFORMATION MANAGEMENT | Facility: CLINIC | Age: 60
End: 2025-01-21

## 2025-03-11 RX ORDER — CELECOXIB 200 MG/1
200 CAPSULE ORAL 2 TIMES DAILY
Status: ON HOLD | COMMUNITY
End: 2025-03-18

## 2025-03-11 RX ORDER — OMEPRAZOLE 20 MG/1
20 CAPSULE, DELAYED RELEASE ORAL DAILY
COMMUNITY

## 2025-03-11 NOTE — PROGRESS NOTES
PTA medications updated by Medication Scribe prior to surgery via phone call with patient (last doses completed by Nurse)     Medication history sources: Patient, Surescripts, and H&P  In the past week, patient estimated taking medication this percent of the time: Greater than 90%      Significant changes made to the medication list:  Patient reports no longer taking the following meds (med scribe removed from PTA med list): Aspirin, Oxycodone, Senokot-S      Additional medication history information:   None    Medication reconciliation completed by provider prior to medication history? No    Time spent in this activity: 20 minutes    The information provided in this note is only as accurate as the sources available at the time of update(s)      Prior to Admission medications    Medication Sig Last Dose Taking? Auth Provider Long Term End Date   acetaminophen (TYLENOL) 325 MG tablet Take 2 tablets (650 mg) by mouth every 4 hours as needed for other (mild pain)  Yes Glen Rahman MD     celecoxib (CELEBREX) 200 MG capsule Take 200 mg by mouth 2 times daily. 3/10/2025 Evening Yes Reported, Patient Yes    citalopram (CELEXA) 40 MG tablet Take 40 mg by mouth daily Morning Yes Reported, Patient Yes 3/11/25   omeprazole (PRILOSEC) 20 MG DR capsule Take 20 mg by mouth daily. Morning Yes Reported, Patient     amLODIPine (NORVASC) 10 MG tablet Take 10 mg by mouth daily. Morning Yes Reported, Patient Yes    amphetamine-dextroamphetamine (ADDERALL) 20 MG tablet Take 20 mg by mouth 3 times daily. Noon Yes Reported, Patient     lisinopril (ZESTRIL) 40 MG tablet TAKE 1 TABLET BY MOUTH EVERY DAY  Yes Reported, Patient Yes    metoprolol succinate ER (TOPROL-XL) 25 MG 24 hr tablet TAKE 1 TABLET BY MOUTH EVERY DAY  Yes Reported, Patient Yes        Medication history completed by: Fred Dhillon

## 2025-03-16 ENCOUNTER — ANESTHESIA EVENT (OUTPATIENT)
Dept: SURGERY | Facility: CLINIC | Age: 60
End: 2025-03-16
Payer: COMMERCIAL

## 2025-03-17 ENCOUNTER — HOSPITAL ENCOUNTER (OUTPATIENT)
Facility: CLINIC | Age: 60
Discharge: HOME | End: 2025-03-18
Attending: ORTHOPAEDIC SURGERY | Admitting: ORTHOPAEDIC SURGERY
Payer: COMMERCIAL

## 2025-03-17 ENCOUNTER — APPOINTMENT (OUTPATIENT)
Dept: GENERAL RADIOLOGY | Facility: CLINIC | Age: 60
End: 2025-03-17
Attending: ORTHOPAEDIC SURGERY
Payer: COMMERCIAL

## 2025-03-17 ENCOUNTER — APPOINTMENT (OUTPATIENT)
Dept: PHYSICAL THERAPY | Facility: CLINIC | Age: 60
End: 2025-03-17
Attending: ORTHOPAEDIC SURGERY
Payer: COMMERCIAL

## 2025-03-17 ENCOUNTER — ANESTHESIA (OUTPATIENT)
Dept: SURGERY | Facility: CLINIC | Age: 60
End: 2025-03-17
Payer: COMMERCIAL

## 2025-03-17 DIAGNOSIS — Z96.651 S/P TOTAL KNEE ARTHROPLASTY, RIGHT: ICD-10-CM

## 2025-03-17 DIAGNOSIS — Z96.651 STATUS POST REVISION OF TOTAL REPLACEMENT OF RIGHT KNEE: Primary | ICD-10-CM

## 2025-03-17 LAB
ABO + RH BLD: NORMAL
BLD GP AB SCN SERPL QL: NEGATIVE
CREAT SERPL-MCNC: 0.73 MG/DL (ref 0.67–1.17)
EGFRCR SERPLBLD CKD-EPI 2021: >90 ML/MIN/1.73M2
ERYTHROCYTE [DISTWIDTH] IN BLOOD BY AUTOMATED COUNT: 11.9 % (ref 10–15)
FASTING STATUS PATIENT QL REPORTED: YES
GLUCOSE BLDC GLUCOMTR-MCNC: 210 MG/DL (ref 70–99)
GLUCOSE SERPL-MCNC: 153 MG/DL (ref 70–99)
HCT VFR BLD AUTO: 40 % (ref 40–53)
HGB BLD-MCNC: 14.8 G/DL (ref 13.3–17.7)
MCH RBC QN AUTO: 33.1 PG (ref 26.5–33)
MCHC RBC AUTO-ENTMCNC: 37 G/DL (ref 31.5–36.5)
MCV RBC AUTO: 90 FL (ref 78–100)
PLATELET # BLD AUTO: 208 10E3/UL (ref 150–450)
POTASSIUM SERPL-SCNC: 3.5 MMOL/L (ref 3.4–5.3)
RBC # BLD AUTO: 4.47 10E6/UL (ref 4.4–5.9)
SPECIMEN EXP DATE BLD: NORMAL
WBC # BLD AUTO: 5.4 10E3/UL (ref 4–11)

## 2025-03-17 PROCEDURE — 250N000011 HC RX IP 250 OP 636: Performed by: ORTHOPAEDIC SURGERY

## 2025-03-17 PROCEDURE — 82962 GLUCOSE BLOOD TEST: CPT

## 2025-03-17 PROCEDURE — 370N000017 HC ANESTHESIA TECHNICAL FEE, PER MIN: Performed by: ORTHOPAEDIC SURGERY

## 2025-03-17 PROCEDURE — 84132 ASSAY OF SERUM POTASSIUM: CPT | Performed by: ANESTHESIOLOGY

## 2025-03-17 PROCEDURE — C1776 JOINT DEVICE (IMPLANTABLE): HCPCS | Performed by: ORTHOPAEDIC SURGERY

## 2025-03-17 PROCEDURE — 82565 ASSAY OF CREATININE: CPT | Performed by: ANESTHESIOLOGY

## 2025-03-17 PROCEDURE — 97161 PT EVAL LOW COMPLEX 20 MIN: CPT | Mod: GP

## 2025-03-17 PROCEDURE — 250N000009 HC RX 250: Performed by: ANESTHESIOLOGY

## 2025-03-17 PROCEDURE — 250N000011 HC RX IP 250 OP 636: Performed by: NURSE ANESTHETIST, CERTIFIED REGISTERED

## 2025-03-17 PROCEDURE — 710N000009 HC RECOVERY PHASE 1, LEVEL 1, PER MIN: Performed by: ORTHOPAEDIC SURGERY

## 2025-03-17 PROCEDURE — 258N000003 HC RX IP 258 OP 636: Performed by: NURSE ANESTHETIST, CERTIFIED REGISTERED

## 2025-03-17 PROCEDURE — 250N000025 HC SEVOFLURANE, PER MIN: Performed by: ORTHOPAEDIC SURGERY

## 2025-03-17 PROCEDURE — 360N000078 HC SURGERY LEVEL 5, PER MIN: Performed by: ORTHOPAEDIC SURGERY

## 2025-03-17 PROCEDURE — 250N000011 HC RX IP 250 OP 636: Mod: JW | Performed by: NURSE ANESTHETIST, CERTIFIED REGISTERED

## 2025-03-17 PROCEDURE — 97530 THERAPEUTIC ACTIVITIES: CPT | Mod: GP

## 2025-03-17 PROCEDURE — 250N000009 HC RX 250: Performed by: NURSE ANESTHETIST, CERTIFIED REGISTERED

## 2025-03-17 PROCEDURE — 258N000003 HC RX IP 258 OP 636: Performed by: ANESTHESIOLOGY

## 2025-03-17 PROCEDURE — 999N000141 HC STATISTIC PRE-PROCEDURE NURSING ASSESSMENT: Performed by: ORTHOPAEDIC SURGERY

## 2025-03-17 PROCEDURE — 258N000003 HC RX IP 258 OP 636: Performed by: ORTHOPAEDIC SURGERY

## 2025-03-17 PROCEDURE — 36415 COLL VENOUS BLD VENIPUNCTURE: CPT | Performed by: ANESTHESIOLOGY

## 2025-03-17 PROCEDURE — 999N000065 XR KNEE PORT RIGHT 1/2 VIEWS: Mod: RT

## 2025-03-17 PROCEDURE — 97116 GAIT TRAINING THERAPY: CPT | Mod: GP

## 2025-03-17 PROCEDURE — 272N000001 HC OR GENERAL SUPPLY STERILE: Performed by: ORTHOPAEDIC SURGERY

## 2025-03-17 PROCEDURE — 271N000001 HC OR GENERAL SUPPLY NON-STERILE: Performed by: ORTHOPAEDIC SURGERY

## 2025-03-17 PROCEDURE — 64447 NJX AA&/STRD FEMORAL NRV IMG: CPT | Mod: RT,XU

## 2025-03-17 PROCEDURE — 250N000009 HC RX 250: Performed by: ORTHOPAEDIC SURGERY

## 2025-03-17 PROCEDURE — 86900 BLOOD TYPING SEROLOGIC ABO: CPT | Performed by: ORTHOPAEDIC SURGERY

## 2025-03-17 PROCEDURE — 250N000011 HC RX IP 250 OP 636: Performed by: ANESTHESIOLOGY

## 2025-03-17 PROCEDURE — 250N000013 HC RX MED GY IP 250 OP 250 PS 637: Performed by: ORTHOPAEDIC SURGERY

## 2025-03-17 PROCEDURE — 258N000001 HC RX 258: Performed by: ORTHOPAEDIC SURGERY

## 2025-03-17 PROCEDURE — 85014 HEMATOCRIT: CPT | Performed by: ORTHOPAEDIC SURGERY

## 2025-03-17 PROCEDURE — 82947 ASSAY GLUCOSE BLOOD QUANT: CPT | Performed by: ANESTHESIOLOGY

## 2025-03-17 DEVICE — IMPLANTABLE DEVICE: Type: IMPLANTABLE DEVICE | Site: KNEE | Status: FUNCTIONAL

## 2025-03-17 RX ORDER — PROPOFOL 10 MG/ML
INJECTION, EMULSION INTRAVENOUS PRN
Status: DISCONTINUED | OUTPATIENT
Start: 2025-03-17 | End: 2025-03-17

## 2025-03-17 RX ORDER — ACETAMINOPHEN 325 MG/1
975 TABLET ORAL EVERY 8 HOURS
Status: DISCONTINUED | OUTPATIENT
Start: 2025-03-17 | End: 2025-03-18 | Stop reason: HOSPADM

## 2025-03-17 RX ORDER — ONDANSETRON 4 MG/1
4 TABLET, ORALLY DISINTEGRATING ORAL EVERY 6 HOURS PRN
Status: DISCONTINUED | OUTPATIENT
Start: 2025-03-17 | End: 2025-03-18 | Stop reason: HOSPADM

## 2025-03-17 RX ORDER — METOPROLOL SUCCINATE 25 MG/1
25 TABLET, EXTENDED RELEASE ORAL DAILY
Status: DISCONTINUED | OUTPATIENT
Start: 2025-03-18 | End: 2025-03-18 | Stop reason: HOSPADM

## 2025-03-17 RX ORDER — FENTANYL CITRATE 0.05 MG/ML
25-100 INJECTION, SOLUTION INTRAMUSCULAR; INTRAVENOUS
Status: DISCONTINUED | OUTPATIENT
Start: 2025-03-17 | End: 2025-03-17 | Stop reason: HOSPADM

## 2025-03-17 RX ORDER — FENTANYL CITRATE 50 UG/ML
25 INJECTION, SOLUTION INTRAMUSCULAR; INTRAVENOUS EVERY 5 MIN PRN
Status: DISCONTINUED | OUTPATIENT
Start: 2025-03-17 | End: 2025-03-17 | Stop reason: HOSPADM

## 2025-03-17 RX ORDER — HYDRALAZINE HYDROCHLORIDE 20 MG/ML
10 INJECTION INTRAMUSCULAR; INTRAVENOUS EVERY 4 HOURS PRN
Status: DISCONTINUED | OUTPATIENT
Start: 2025-03-17 | End: 2025-03-18 | Stop reason: HOSPADM

## 2025-03-17 RX ORDER — AMLODIPINE BESYLATE 10 MG/1
10 TABLET ORAL DAILY
Status: DISCONTINUED | OUTPATIENT
Start: 2025-03-18 | End: 2025-03-18 | Stop reason: HOSPADM

## 2025-03-17 RX ORDER — NALOXONE HYDROCHLORIDE 0.4 MG/ML
0.4 INJECTION, SOLUTION INTRAMUSCULAR; INTRAVENOUS; SUBCUTANEOUS
Status: DISCONTINUED | OUTPATIENT
Start: 2025-03-17 | End: 2025-03-18 | Stop reason: HOSPADM

## 2025-03-17 RX ORDER — ONDANSETRON 4 MG/1
4 TABLET, ORALLY DISINTEGRATING ORAL EVERY 30 MIN PRN
Status: DISCONTINUED | OUTPATIENT
Start: 2025-03-17 | End: 2025-03-17 | Stop reason: HOSPADM

## 2025-03-17 RX ORDER — SODIUM CHLORIDE, SODIUM LACTATE, POTASSIUM CHLORIDE, CALCIUM CHLORIDE 600; 310; 30; 20 MG/100ML; MG/100ML; MG/100ML; MG/100ML
INJECTION, SOLUTION INTRAVENOUS CONTINUOUS
Status: DISCONTINUED | OUTPATIENT
Start: 2025-03-17 | End: 2025-03-17 | Stop reason: HOSPADM

## 2025-03-17 RX ORDER — TRANEXAMIC ACID 650 MG/1
1950 TABLET ORAL ONCE
Status: COMPLETED | OUTPATIENT
Start: 2025-03-17 | End: 2025-03-17

## 2025-03-17 RX ORDER — CEFAZOLIN SODIUM/WATER 2 G/20 ML
2 SYRINGE (ML) INTRAVENOUS
Status: DISCONTINUED | OUTPATIENT
Start: 2025-03-17 | End: 2025-03-17 | Stop reason: HOSPADM

## 2025-03-17 RX ORDER — NALOXONE HYDROCHLORIDE 0.4 MG/ML
0.2 INJECTION, SOLUTION INTRAMUSCULAR; INTRAVENOUS; SUBCUTANEOUS
Status: DISCONTINUED | OUTPATIENT
Start: 2025-03-17 | End: 2025-03-18 | Stop reason: HOSPADM

## 2025-03-17 RX ORDER — ONDANSETRON 2 MG/ML
4 INJECTION INTRAMUSCULAR; INTRAVENOUS EVERY 30 MIN PRN
Status: DISCONTINUED | OUTPATIENT
Start: 2025-03-17 | End: 2025-03-17 | Stop reason: HOSPADM

## 2025-03-17 RX ORDER — HYDROMORPHONE HCL IN WATER/PF 6 MG/30 ML
0.4 PATIENT CONTROLLED ANALGESIA SYRINGE INTRAVENOUS
Status: DISCONTINUED | OUTPATIENT
Start: 2025-03-17 | End: 2025-03-18 | Stop reason: HOSPADM

## 2025-03-17 RX ORDER — OXYCODONE HYDROCHLORIDE 5 MG/1
5 TABLET ORAL EVERY 4 HOURS PRN
Status: DISCONTINUED | OUTPATIENT
Start: 2025-03-17 | End: 2025-03-18 | Stop reason: HOSPADM

## 2025-03-17 RX ORDER — POLYETHYLENE GLYCOL 3350 17 G/17G
17 POWDER, FOR SOLUTION ORAL DAILY
Status: DISCONTINUED | OUTPATIENT
Start: 2025-03-18 | End: 2025-03-18 | Stop reason: HOSPADM

## 2025-03-17 RX ORDER — SODIUM CHLORIDE, SODIUM LACTATE, POTASSIUM CHLORIDE, CALCIUM CHLORIDE 600; 310; 30; 20 MG/100ML; MG/100ML; MG/100ML; MG/100ML
INJECTION, SOLUTION INTRAVENOUS CONTINUOUS
Status: DISCONTINUED | OUTPATIENT
Start: 2025-03-17 | End: 2025-03-18 | Stop reason: HOSPADM

## 2025-03-17 RX ORDER — ONDANSETRON 2 MG/ML
4 INJECTION INTRAMUSCULAR; INTRAVENOUS EVERY 6 HOURS PRN
Status: DISCONTINUED | OUTPATIENT
Start: 2025-03-17 | End: 2025-03-18 | Stop reason: HOSPADM

## 2025-03-17 RX ORDER — FENTANYL CITRATE 50 UG/ML
50 INJECTION, SOLUTION INTRAMUSCULAR; INTRAVENOUS EVERY 5 MIN PRN
Status: DISCONTINUED | OUTPATIENT
Start: 2025-03-17 | End: 2025-03-17 | Stop reason: HOSPADM

## 2025-03-17 RX ORDER — LIDOCAINE 40 MG/G
CREAM TOPICAL
Status: DISCONTINUED | OUTPATIENT
Start: 2025-03-17 | End: 2025-03-18 | Stop reason: HOSPADM

## 2025-03-17 RX ORDER — OXYCODONE HYDROCHLORIDE 5 MG/1
5-10 TABLET ORAL EVERY 4 HOURS PRN
Qty: 30 TABLET | Refills: 0 | Status: SHIPPED | OUTPATIENT
Start: 2025-03-17

## 2025-03-17 RX ORDER — NALOXONE HYDROCHLORIDE 0.4 MG/ML
0.1 INJECTION, SOLUTION INTRAMUSCULAR; INTRAVENOUS; SUBCUTANEOUS
Status: DISCONTINUED | OUTPATIENT
Start: 2025-03-17 | End: 2025-03-17 | Stop reason: HOSPADM

## 2025-03-17 RX ORDER — DEXAMETHASONE SODIUM PHOSPHATE 4 MG/ML
4 INJECTION, SOLUTION INTRA-ARTICULAR; INTRALESIONAL; INTRAMUSCULAR; INTRAVENOUS; SOFT TISSUE
Status: DISCONTINUED | OUTPATIENT
Start: 2025-03-17 | End: 2025-03-17 | Stop reason: HOSPADM

## 2025-03-17 RX ORDER — AMOXICILLIN 250 MG
1-2 CAPSULE ORAL 2 TIMES DAILY
Qty: 30 TABLET | Refills: 0 | Status: SHIPPED | OUTPATIENT
Start: 2025-03-17

## 2025-03-17 RX ORDER — SODIUM CHLORIDE, SODIUM LACTATE, POTASSIUM CHLORIDE, CALCIUM CHLORIDE 600; 310; 30; 20 MG/100ML; MG/100ML; MG/100ML; MG/100ML
INJECTION, SOLUTION INTRAVENOUS CONTINUOUS PRN
Status: DISCONTINUED | OUTPATIENT
Start: 2025-03-17 | End: 2025-03-17

## 2025-03-17 RX ORDER — HYDROXYZINE HYDROCHLORIDE 25 MG/1
25 TABLET, FILM COATED ORAL EVERY 6 HOURS PRN
Qty: 30 TABLET | Refills: 0 | Status: SHIPPED | OUTPATIENT
Start: 2025-03-17

## 2025-03-17 RX ORDER — ONDANSETRON 2 MG/ML
INJECTION INTRAMUSCULAR; INTRAVENOUS PRN
Status: DISCONTINUED | OUTPATIENT
Start: 2025-03-17 | End: 2025-03-17

## 2025-03-17 RX ORDER — ASPIRIN 325 MG
325 TABLET, DELAYED RELEASE (ENTERIC COATED) ORAL DAILY
Status: DISCONTINUED | OUTPATIENT
Start: 2025-03-18 | End: 2025-03-18 | Stop reason: HOSPADM

## 2025-03-17 RX ORDER — LIDOCAINE HYDROCHLORIDE 20 MG/ML
INJECTION, SOLUTION INFILTRATION; PERINEURAL PRN
Status: DISCONTINUED | OUTPATIENT
Start: 2025-03-17 | End: 2025-03-17

## 2025-03-17 RX ORDER — BISACODYL 10 MG
10 SUPPOSITORY, RECTAL RECTAL DAILY PRN
Status: DISCONTINUED | OUTPATIENT
Start: 2025-03-20 | End: 2025-03-18 | Stop reason: HOSPADM

## 2025-03-17 RX ORDER — OMEPRAZOLE 20 MG/1
20 CAPSULE, DELAYED RELEASE ORAL DAILY
Status: DISCONTINUED | OUTPATIENT
Start: 2025-03-17 | End: 2025-03-17

## 2025-03-17 RX ORDER — CEFAZOLIN SODIUM 2 G/50ML
2 SOLUTION INTRAVENOUS EVERY 8 HOURS
Status: COMPLETED | OUTPATIENT
Start: 2025-03-17 | End: 2025-03-18

## 2025-03-17 RX ORDER — HYDROXYZINE HYDROCHLORIDE 25 MG/1
25 TABLET, FILM COATED ORAL EVERY 6 HOURS PRN
Status: DISCONTINUED | OUTPATIENT
Start: 2025-03-17 | End: 2025-03-18 | Stop reason: HOSPADM

## 2025-03-17 RX ORDER — MAGNESIUM HYDROXIDE 1200 MG/15ML
LIQUID ORAL PRN
Status: DISCONTINUED | OUTPATIENT
Start: 2025-03-17 | End: 2025-03-17 | Stop reason: HOSPADM

## 2025-03-17 RX ORDER — HYDROMORPHONE HCL IN WATER/PF 6 MG/30 ML
0.4 PATIENT CONTROLLED ANALGESIA SYRINGE INTRAVENOUS EVERY 5 MIN PRN
Status: DISCONTINUED | OUTPATIENT
Start: 2025-03-17 | End: 2025-03-17 | Stop reason: HOSPADM

## 2025-03-17 RX ORDER — FENTANYL CITRATE 0.05 MG/ML
50 INJECTION, SOLUTION INTRAMUSCULAR; INTRAVENOUS
Status: DISCONTINUED | OUTPATIENT
Start: 2025-03-17 | End: 2025-03-17 | Stop reason: HOSPADM

## 2025-03-17 RX ORDER — LIDOCAINE 40 MG/G
CREAM TOPICAL
Status: DISCONTINUED | OUTPATIENT
Start: 2025-03-17 | End: 2025-03-17 | Stop reason: HOSPADM

## 2025-03-17 RX ORDER — FLUMAZENIL 0.1 MG/ML
0.2 INJECTION, SOLUTION INTRAVENOUS
Status: DISCONTINUED | OUTPATIENT
Start: 2025-03-17 | End: 2025-03-17 | Stop reason: HOSPADM

## 2025-03-17 RX ORDER — ACETAMINOPHEN 325 MG/1
650 TABLET ORAL EVERY 4 HOURS PRN
Qty: 100 TABLET | Refills: 0 | Status: SHIPPED | OUTPATIENT
Start: 2025-03-17

## 2025-03-17 RX ORDER — PANTOPRAZOLE SODIUM 40 MG/1
40 TABLET, DELAYED RELEASE ORAL
Status: DISCONTINUED | OUTPATIENT
Start: 2025-03-18 | End: 2025-03-18 | Stop reason: HOSPADM

## 2025-03-17 RX ORDER — LISINOPRIL 40 MG/1
40 TABLET ORAL DAILY
Status: DISCONTINUED | OUTPATIENT
Start: 2025-03-18 | End: 2025-03-18 | Stop reason: HOSPADM

## 2025-03-17 RX ORDER — CEFAZOLIN SODIUM/WATER 2 G/20 ML
2 SYRINGE (ML) INTRAVENOUS SEE ADMIN INSTRUCTIONS
Status: DISCONTINUED | OUTPATIENT
Start: 2025-03-17 | End: 2025-03-17 | Stop reason: HOSPADM

## 2025-03-17 RX ORDER — DEXMEDETOMIDINE HYDROCHLORIDE 4 UG/ML
INJECTION, SOLUTION INTRAVENOUS PRN
Status: DISCONTINUED | OUTPATIENT
Start: 2025-03-17 | End: 2025-03-17

## 2025-03-17 RX ORDER — PROCHLORPERAZINE MALEATE 10 MG
10 TABLET ORAL EVERY 6 HOURS PRN
Status: DISCONTINUED | OUTPATIENT
Start: 2025-03-17 | End: 2025-03-18 | Stop reason: HOSPADM

## 2025-03-17 RX ORDER — FENTANYL CITRATE 50 UG/ML
INJECTION, SOLUTION INTRAMUSCULAR; INTRAVENOUS PRN
Status: DISCONTINUED | OUTPATIENT
Start: 2025-03-17 | End: 2025-03-17

## 2025-03-17 RX ORDER — ASPIRIN 325 MG
325 TABLET, DELAYED RELEASE (ENTERIC COATED) ORAL DAILY
Qty: 30 TABLET | Refills: 0 | Status: SHIPPED | OUTPATIENT
Start: 2025-03-17

## 2025-03-17 RX ORDER — OXYCODONE HYDROCHLORIDE 5 MG/1
10 TABLET ORAL EVERY 4 HOURS PRN
Status: DISCONTINUED | OUTPATIENT
Start: 2025-03-17 | End: 2025-03-18 | Stop reason: HOSPADM

## 2025-03-17 RX ORDER — HYDROMORPHONE HCL IN WATER/PF 6 MG/30 ML
0.2 PATIENT CONTROLLED ANALGESIA SYRINGE INTRAVENOUS EVERY 5 MIN PRN
Status: DISCONTINUED | OUTPATIENT
Start: 2025-03-17 | End: 2025-03-17 | Stop reason: HOSPADM

## 2025-03-17 RX ORDER — AMOXICILLIN 250 MG
1 CAPSULE ORAL 2 TIMES DAILY
Status: DISCONTINUED | OUTPATIENT
Start: 2025-03-17 | End: 2025-03-18 | Stop reason: HOSPADM

## 2025-03-17 RX ORDER — DEXAMETHASONE SODIUM PHOSPHATE 4 MG/ML
INJECTION, SOLUTION INTRA-ARTICULAR; INTRALESIONAL; INTRAMUSCULAR; INTRAVENOUS; SOFT TISSUE PRN
Status: DISCONTINUED | OUTPATIENT
Start: 2025-03-17 | End: 2025-03-17

## 2025-03-17 RX ORDER — HYDROMORPHONE HCL IN WATER/PF 6 MG/30 ML
0.2 PATIENT CONTROLLED ANALGESIA SYRINGE INTRAVENOUS
Status: DISCONTINUED | OUTPATIENT
Start: 2025-03-17 | End: 2025-03-18 | Stop reason: HOSPADM

## 2025-03-17 RX ADMIN — ROPIVACAINE HYDROCHLORIDE 15 ML: 5 INJECTION, SOLUTION EPIDURAL; INFILTRATION; PERINEURAL at 12:22

## 2025-03-17 RX ADMIN — TRANEXAMIC ACID 1 G: 1 INJECTION, SOLUTION INTRAVENOUS at 11:09

## 2025-03-17 RX ADMIN — DEXMEDETOMIDINE HYDROCHLORIDE 8 MCG: 200 INJECTION INTRAVENOUS at 12:30

## 2025-03-17 RX ADMIN — FENTANYL CITRATE 25 MCG: 50 INJECTION, SOLUTION INTRAMUSCULAR; INTRAVENOUS at 13:33

## 2025-03-17 RX ADMIN — SODIUM CHLORIDE, SODIUM LACTATE, POTASSIUM CHLORIDE, AND CALCIUM CHLORIDE: .6; .31; .03; .02 INJECTION, SOLUTION INTRAVENOUS at 15:02

## 2025-03-17 RX ADMIN — Medication 200 MG: at 12:32

## 2025-03-17 RX ADMIN — FENTANYL CITRATE 25 MCG: 50 INJECTION, SOLUTION INTRAMUSCULAR; INTRAVENOUS at 13:11

## 2025-03-17 RX ADMIN — FENTANYL CITRATE 50 MCG: 50 INJECTION INTRAMUSCULAR; INTRAVENOUS at 10:50

## 2025-03-17 RX ADMIN — MIDAZOLAM 2 MG: 1 INJECTION INTRAMUSCULAR; INTRAVENOUS at 09:52

## 2025-03-17 RX ADMIN — FENTANYL CITRATE 100 MCG: 50 INJECTION, SOLUTION INTRAMUSCULAR; INTRAVENOUS at 09:53

## 2025-03-17 RX ADMIN — TRANEXAMIC ACID 1950 MG: 650 TABLET ORAL at 09:07

## 2025-03-17 RX ADMIN — ROCURONIUM BROMIDE 50 MG: 50 INJECTION, SOLUTION INTRAVENOUS at 10:50

## 2025-03-17 RX ADMIN — HYDROMORPHONE HYDROCHLORIDE 0.5 MG: 1 INJECTION, SOLUTION INTRAMUSCULAR; INTRAVENOUS; SUBCUTANEOUS at 12:10

## 2025-03-17 RX ADMIN — DEXAMETHASONE SODIUM PHOSPHATE 4 MG: 4 INJECTION, SOLUTION INTRA-ARTICULAR; INTRALESIONAL; INTRAMUSCULAR; INTRAVENOUS; SOFT TISSUE at 11:19

## 2025-03-17 RX ADMIN — CEFAZOLIN SODIUM 2 G: 2 SOLUTION INTRAVENOUS at 18:43

## 2025-03-17 RX ADMIN — PHENYLEPHRINE HYDROCHLORIDE 0.2 MCG/KG/MIN: 10 INJECTION INTRAVENOUS at 11:27

## 2025-03-17 RX ADMIN — Medication 2 G: at 10:46

## 2025-03-17 RX ADMIN — ACETAMINOPHEN 975 MG: 325 TABLET, FILM COATED ORAL at 16:28

## 2025-03-17 RX ADMIN — SENNOSIDES AND DOCUSATE SODIUM 1 TABLET: 50; 8.6 TABLET ORAL at 21:13

## 2025-03-17 RX ADMIN — ONDANSETRON 4 MG: 2 INJECTION INTRAMUSCULAR; INTRAVENOUS at 12:08

## 2025-03-17 RX ADMIN — PHENYLEPHRINE HYDROCHLORIDE 100 MCG: 10 INJECTION INTRAVENOUS at 10:50

## 2025-03-17 RX ADMIN — PROPOFOL 200 MG: 10 INJECTION, EMULSION INTRAVENOUS at 10:50

## 2025-03-17 RX ADMIN — ROCURONIUM BROMIDE 20 MG: 50 INJECTION, SOLUTION INTRAVENOUS at 11:45

## 2025-03-17 RX ADMIN — OXYCODONE HYDROCHLORIDE 5 MG: 5 TABLET ORAL at 16:28

## 2025-03-17 RX ADMIN — OXYCODONE HYDROCHLORIDE 5 MG: 5 TABLET ORAL at 21:22

## 2025-03-17 RX ADMIN — SODIUM CHLORIDE, SODIUM LACTATE, POTASSIUM CHLORIDE, AND CALCIUM CHLORIDE: .6; .31; .03; .02 INJECTION, SOLUTION INTRAVENOUS at 10:00

## 2025-03-17 RX ADMIN — SODIUM CHLORIDE, SODIUM LACTATE, POTASSIUM CHLORIDE, AND CALCIUM CHLORIDE: .6; .31; .03; .02 INJECTION, SOLUTION INTRAVENOUS at 10:42

## 2025-03-17 RX ADMIN — FENTANYL CITRATE 50 MCG: 50 INJECTION INTRAMUSCULAR; INTRAVENOUS at 11:15

## 2025-03-17 ASSESSMENT — ACTIVITIES OF DAILY LIVING (ADL)
ADLS_ACUITY_SCORE: 25
ADLS_ACUITY_SCORE: 38
ADLS_ACUITY_SCORE: 36
ADLS_ACUITY_SCORE: 25
ADLS_ACUITY_SCORE: 36
ADLS_ACUITY_SCORE: 25
ADLS_ACUITY_SCORE: 25
ADLS_ACUITY_SCORE: 36
ADLS_ACUITY_SCORE: 25
ADLS_ACUITY_SCORE: 38
ADLS_ACUITY_SCORE: 26
ADLS_ACUITY_SCORE: 45
ADLS_ACUITY_SCORE: 25

## 2025-03-17 ASSESSMENT — LIFESTYLE VARIABLES: TOBACCO_USE: 0

## 2025-03-17 NOTE — ANESTHESIA CARE TRANSFER NOTE
Patient: Idris Woodson    Procedure: Procedure(s):  RIGHT TOTAL  KNEE REVISION WITH  EXCHANGE OF POLYETHYLENE INSERT AND COMPLETE SYNOVECTOMY       Diagnosis: Knee pain [M25.569]  Polyethylene wear of right knee joint prosthesis [T84.062A]  Diagnosis Additional Information: No value filed.    Anesthesia Type:   General     Note:    Oropharynx: oropharynx clear of all foreign objects and spontaneously breathing  Level of Consciousness: awake and drowsy  Oxygen Supplementation: face mask  Level of Supplemental Oxygen (L/min / FiO2): 6  Independent Airway: airway patency satisfactory and stable  Dentition: dentition unchanged  Vital Signs Stable: post-procedure vital signs reviewed and stable  Report to RN Given: handoff report given  Patient transferred to: PACU    Handoff Report: Identifed the Patient, Identified the Reponsible Provider, Reviewed the pertinent medical history, Discussed the surgical course, Reviewed Intra-OP anesthesia mangement and issues during anesthesia, Set expectations for post-procedure period and Allowed opportunity for questions and acknowledgement of understanding      Vitals:  Vitals Value Taken Time   /79 03/17/25 1249   Temp 36.2  C (97.16  F) 03/17/25 1252   Pulse 68 03/17/25 1252   Resp 18 03/17/25 1252   SpO2 98 % 03/17/25 1252   Vitals shown include unfiled device data.    Electronically Signed By: JULIENNE Jo CRNA  March 17, 2025  12:53 PM

## 2025-03-17 NOTE — ANESTHESIA POSTPROCEDURE EVALUATION
Patient: Idris Woodson    Procedure: Procedure(s):  RIGHT TOTAL  KNEE REVISION WITH  EXCHANGE OF POLYETHYLENE INSERT AND COMPLETE SYNOVECTOMY       Anesthesia Type:  General    Note:  Disposition: Admission   Postop Pain Control: Uneventful            Sign Out: Well controlled pain   PONV: No   Neuro/Psych: Uneventful            Sign Out: Acceptable/Baseline neuro status   Airway/Respiratory: Uneventful            Sign Out: Acceptable/Baseline resp. status   CV/Hemodynamics: Uneventful            Sign Out: Acceptable CV status   Other NRE: NONE   DID A NON-ROUTINE EVENT OCCUR? No           Last vitals:  Vitals Value Taken Time   /79 03/17/25 1400   Temp 35.9  C (96.62  F) 03/17/25 1411   Pulse 68 03/17/25 1411   Resp 16 03/17/25 1411   SpO2 92 % 03/17/25 1411   Vitals shown include unfiled device data.    Electronically Signed By: David Barbosa MD  March 17, 2025  2:13 PM

## 2025-03-17 NOTE — OP NOTE
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    2) Complete synovectomy .     SURGEON:  Glen Rahman MD     FIRST ASSISTANT:  LUCA Wang    INDICATIONS:  The patient is a 59-year-old gentleman who is now over 10 years out from a right total knee arthroplasty done elsewhere.  He has done well with his knee over the years, but more recently, he has developed some swelling, pain, and instability.  Recent x-rays revealed well-seated components, but there was significant polyethylene wear noted laterally.  We talked about revision of his knee replacement, which would likely just involve replacement of his worn-out tibial polyethylene insert with a new insert, but we would be ready to do a full revision if we could not make his knee stable with a simple insert exchange.  He understood and wished to proceed.        DESCRIPTION OF PROCEDURE:  The patient was brought to the operating room and given an adductor canal block.  They were then given a General endotracheal anesthetic.  Their right knee and right lower extremity were then prepped and draped in the usual sterile fashion.  The knee had motion from a few degrees of recurvatum to full flexion, with hyperlaxity to both varus/valgus and anterior/posterior stress.      The entire procedure was performed without the use of a tourniquet.  An incision was made over the anterior aspect of the knee through a scar over the anteromedial aspect of the knee from prior knee surgeries.  This was carried down through the subcutaneous tissues, down to the fascia.  A standard median parapatellar approach was taken to the joint, which revealed  metallosis changes throughout the synovium.  The components were examined carefully and were found to be well-fixed and in good position.  The tibial polyethylene insert was removed.  It had significant wear over the posterolateral aspect.  The metal components were not damaged.  The PCL appeared intact.      We then performed a thorough synovectomy, removing all of the metallosis from the synovium.  We trialed the knee with various tibial insert trials, and it appeared that a 14 mm insert gave the best fit.  It should be noted that the original insert was a 10 mm insert.  At this point, we injected the posterior capsule with the periarticular analgesic cocktail.  Hemostasis was obtained.  We then snapped the real 14 mm tibial polyethylene insert into the tibial component.  The knee had full range of motion, and the knee was quite stable now.      We irrigated the knee with a dilute solution of Betadine.  It was allowed to sit within the knee for 3 minutes and was thoroughly irrigated from the knee with a liter of normal saline.  The fascia was closed with interrupted 0 Vicryl sutures.  The skin was closed with 2-0 Vicryl subcutaneous sutures and staples.  A sterile compressive dressing was applied to the knee.  The patient was then awakened from anesthesia and transferred to postanesthesia recovery in satisfactory condition.     It should be noted that my assistant was necessary throughout the entire procedure to assist with retraction and positioning.     Glen Rahman MD

## 2025-03-17 NOTE — PROGRESS NOTES

## 2025-03-17 NOTE — ANESTHESIA POSTPROCEDURE EVALUATION
Patient: Idris Woodson    Procedure: Procedure(s):  RIGHT TOTAL  KNEE REVISION WITH  EXCHANGE OF POLYETHYLENE INSERT AND COMPLETE SYNOVECTOMY       Anesthesia Type:  General    Note:  Disposition: Admission   Postop Pain Control: Uneventful            Sign Out: Well controlled pain   PONV: No   Neuro/Psych: Uneventful            Sign Out: Acceptable/Baseline neuro status   Airway/Respiratory: Uneventful            Sign Out: Acceptable/Baseline resp. status   CV/Hemodynamics: Uneventful            Sign Out: Acceptable CV status   Other NRE: NONE   DID A NON-ROUTINE EVENT OCCUR? No           Last vitals:  Vitals Value Taken Time   /79 03/17/25 1400   Temp 35.9  C (96.62  F) 03/17/25 1410   Pulse 64 03/17/25 1411   Resp 14 03/17/25 1411   SpO2 93 % 03/17/25 1411   Vitals shown include unfiled device data.    Electronically Signed By: David Barbosa MD  March 17, 2025  2:12 PM

## 2025-03-17 NOTE — ANESTHESIA PROCEDURE NOTES
"DAILY PROGRESS NOTE  [facility]    Patient Identification:  Name: Monica Scherer  Age: 88 y.o.  Sex: female  :  1933  MRN: [id number]         Primary Care Physician: Amilcar Mauricio DO    Subjective:  Interval History:She complains of some rectal bleeding.Not as bad. She thinks it her hemorrhoids.    Objective:    Scheduled Meds:atorvastatin, 40 mg, Oral, Nightly  felodipine, 10 mg, Oral, Daily  Hydrocort-Pramoxine (Perianal), , Rectal, BID  metoprolol tartrate, 25 mg, Oral, Q12H  pantoprazole, 40 mg, Oral, QAM      Continuous Infusions:sodium chloride, 75 mL/hr, Last Rate: 75 mL/hr (09/15/21 2201)  sodium chloride, 30 mL/hr, Last Rate: Stopped (21 1427)        Vital signs in last 24 hours:  Temp:  [97.4 °F (36.3 °C)-98.3 °F (36.8 °C)] 97.4 °F (36.3 °C)  Heart Rate:  [74-81] 76  Resp:  [16] 16  BP: ()/(48-90) 150/48    Intake/Output:    Intake/Output Summary (Last 24 hours) at 2021 1618  Last data filed at 2021 1427  Gross per 24 hour   Intake 2325 ml   Output 1000 ml   Net 1325 ml       Exam:  /48 (BP Location: Left arm, Patient Position: Lying)   Pulse 76   Temp 97.4 °F (36.3 °C) (Oral)   Resp 16   Ht 162.6 cm (64\")   Wt 77.1 kg (170 lb)   SpO2 98%   BMI 29.18 kg/m²     General Appearance:    Alert, cooperative, no distress   Head:    Normocephalic, without obvious abnormality, atraumatic   Eyes:       Throat:   Lips, tongue, gums normal   Neck:   Supple, symmetrical, trachea midline, no JVD   Lungs:     Clear to auscultation bilaterally, respirations unlabored   Chest Wall:    No tenderness or deformity    Heart:    Regular rate and rhythm, S1 and S2 normal, no murmur,no  Rub or gallop   Abdomen:     Soft, nontender, bowel sounds active, no masses, no organomegaly    Extremities:   Extremities normal, atraumatic, no cyanosis or edema   Pulses:      Skin:   Skin is warm and dry,  no rashes or palpable lesions   Neurologic:   no focal deficits noted " Airway       Patient location during procedure: OR       Procedure Start/Stop Times: 3/17/2025 10:53 AM  Staff -        Anesthesiologist:  Niesha Carrizales       CRNA: Warner Calixto APRN CRNA       Performed By: CRNAIndications and Patient Condition       Indications for airway management: hugo-procedural         Mask difficulty assessment: 2 - vent by mask + OA or adjuvant +/- NMBA    Final Airway Details       Final airway type: endotracheal airway       Successful airway: ETT - single  Endotracheal Airway Details        ETT size (mm): 8.0       Cuffed: yes       Successful intubation technique: video laryngoscopy       VL Blade Size: Glidescope 4       Grade View of Cords: 1       Adjucts: stylet       Position: Right       Measured from: lips       Secured at (cm): 23       Bite block used: None    Post intubation assessment        Placement verified by: capnometry, equal breath sounds and chest rise        Number of attempts at approach: 1       Secured with: tape       Ease of procedure: easy       Dentition: Intact    Medication(s) Administered   Medication Administration Time: 3/17/2025 10:53 AM              Lab Results (last 72 hours)     Procedure Component Value Units Date/Time    Hemoglobin & Hematocrit, Blood [[id number]]  (Abnormal) Collected: 09/12/21 0818    Specimen: Blood Updated: 09/12/21 0851     Hemoglobin 10.2 g/dL      Hematocrit 33.5 %     Basic Metabolic Panel [[id number]]  (Abnormal) Collected: 09/11/21 2321    Specimen: Blood Updated: 09/12/21 0000     Glucose 150 mg/dL      BUN 21 mg/dL      Creatinine 0.92 mg/dL      Sodium 139 mmol/L      Potassium 3.6 mmol/L      Chloride 106 mmol/L      CO2 22.6 mmol/L      Calcium 8.5 mg/dL      eGFR Non African Amer 58 mL/min/1.73      BUN/Creatinine Ratio 22.8     Anion Gap 10.4 mmol/L     Narrative:      GFR Normal >60  Chronic Kidney Disease <60  Kidney Failure <15      Hemoglobin & Hematocrit, Blood [[id number]]  (Abnormal) Collected: 09/11/21 2321    Specimen: Blood Updated: 09/11/21 2338     Hemoglobin 9.9 g/dL      Hematocrit 30.6 %     CBC & Differential [[id number]]  (Abnormal) Collected: 09/11/21 2321    Specimen: Blood Updated: 09/11/21 2338    Narrative:      The following orders were created for panel order CBC & Differential.  Procedure                               Abnormality         Status                     ---------                               -----------         ------                     CBC Auto Differential[[id number]]        Abnormal            Final result                 Please view results for these tests on the individual orders.    CBC Auto Differential [[id number]]  (Abnormal) Collected: 09/11/21 2321    Specimen: Blood Updated: 09/11/21 2338     WBC 10.93 10*3/mm3      RBC 3.43 10*6/mm3      Hemoglobin 9.9 g/dL      Hematocrit 30.6 %      MCV 89.2 fL      MCH 28.9 pg      MCHC 32.4 g/dL      RDW 15.6 %      RDW-SD 50.2 fl      MPV 10.3 fL      Platelets 196 10*3/mm3      Neutrophil % 72.0 %      Lymphocyte % 18.8 %      Monocyte % 6.9 %      Eosinophil % 1.4 %      Basophil % 0.4 %      Immature Grans % 0.5 %      Neutrophils,  Absolute 7.88 10*3/mm3      Lymphocytes, Absolute 2.06 10*3/mm3      Monocytes, Absolute 0.75 10*3/mm3      Eosinophils, Absolute 0.15 10*3/mm3      Basophils, Absolute 0.04 10*3/mm3      Immature Grans, Absolute 0.05 10*3/mm3      nRBC 0.0 /100 WBC     Hemoglobin & Hematocrit, Blood [[id number]]  (Abnormal) Collected: 09/11/21 1954    Specimen: Blood Updated: 09/11/21 2026     Hemoglobin 11.4 g/dL      Hematocrit 36.8 %     Basic Metabolic Panel [[id number]]  (Abnormal) Collected: 09/11/21 0705    Specimen: Blood Updated: 09/11/21 0753     Glucose 112 mg/dL      BUN 21 mg/dL      Creatinine 0.79 mg/dL      Sodium 137 mmol/L      Potassium 3.5 mmol/L      Chloride 102 mmol/L      CO2 24.0 mmol/L      Calcium 8.4 mg/dL      eGFR Non African Amer 69 mL/min/1.73      BUN/Creatinine Ratio 26.6     Anion Gap 11.0 mmol/L     Narrative:      GFR Normal >60  Chronic Kidney Disease <60  Kidney Failure <15      CBC & Differential [[id number]]  (Abnormal) Collected: 09/11/21 0705    Specimen: Blood Updated: 09/11/21 0724    Narrative:      The following orders were created for panel order CBC & Differential.  Procedure                               Abnormality         Status                     ---------                               -----------         ------                     CBC Auto Differential[[id number]]        Abnormal            Final result                 Please view results for these tests on the individual orders.    CBC Auto Differential [[id number]]  (Abnormal) Collected: 09/11/21 0705    Specimen: Blood Updated: 09/11/21 0724     WBC 10.56 10*3/mm3      RBC 3.86 10*6/mm3      Hemoglobin 11.1 g/dL      Hematocrit 34.6 %      MCV 89.6 fL      MCH 28.8 pg      MCHC 32.1 g/dL      RDW 15.7 %      RDW-SD 51.2 fl      MPV 10.3 fL      Platelets 216 10*3/mm3      Neutrophil % 69.5 %      Lymphocyte % 22.5 %      Monocyte % 6.3 %      Eosinophil % 0.9 %      Basophil % 0.3 %      Immature Grans % 0.5 %      Neutrophils,  Absolute 7.34 10*3/mm3      Lymphocytes, Absolute 2.38 10*3/mm3      Monocytes, Absolute 0.67 10*3/mm3      Eosinophils, Absolute 0.09 10*3/mm3      Basophils, Absolute 0.03 10*3/mm3      Immature Grans, Absolute 0.05 10*3/mm3      nRBC 0.0 /100 WBC     Urinalysis With Culture If Indicated - Kidney, Left [[id number]]  (Normal) Collected: 09/11/21 0411    Specimen: Urine from Kidney, Left Updated: 09/11/21 0702     Color, UA Yellow     Appearance, UA Clear     pH, UA <=5.0     Specific Gravity, UA 1.018     Glucose, UA Negative     Ketones, UA Negative     Bilirubin, UA Negative     Blood, UA Negative     Protein, UA Negative     Leuk Esterase, UA Negative     Nitrite, UA Negative     Urobilinogen, UA 1.0 E.U./dL    Narrative:      Urine microscopic not indicated.    COVID PRE-OP / PRE-PROCEDURE SCREENING ORDER (NO ISOLATION) - Swab, Nasopharynx [[id number]]  (Normal) Collected: 09/10/21 2223    Specimen: Swab from Nasopharynx Updated: 09/11/21 0241    Narrative:      The following orders were created for panel order COVID PRE-OP / PRE-PROCEDURE SCREENING ORDER (NO ISOLATION) - Swab, Nasopharynx.  Procedure                               Abnormality         Status                     ---------                               -----------         ------                     COVID-19,APTIMA PANTHER(...[[id number]]  Normal              Final result                 Please view results for these tests on the individual orders.    COVID-19,APTIMA PANTHER(BISHOP), LASHON, NP/OP SWAB IN UTM/VTM/SALINE TRANSPORT MEDIA,24 HR TAT - Swab, Nasopharynx [[id number]]  (Normal) Collected: 09/10/21 2223    Specimen: Swab from Nasopharynx Updated: 09/11/21 0241     COVID19 Not Detected    Narrative:      Fact sheet for providers: https://www.fda.gov/media/456264/download     Fact sheet for patients: https://www.fda.gov/media/829978/download    Test performed by RT PCR.    Urinalysis, Microscopic Only - Urine, Catheter [[id number]]  (Abnormal)  Collected: 09/10/21 2003    Specimen: Urine, Catheter Updated: 09/10/21 2031     RBC, UA 0-2 /HPF      WBC, UA 0-2 /HPF      Bacteria, UA 1+ /HPF      Squamous Epithelial Cells, UA 7-12 /HPF      Hyaline Casts, UA 3-6 /LPF      Methodology Automated Microscopy    Urinalysis With Microscopic If Indicated (No Culture) - Urine, Catheter [[id number]]  (Abnormal) Collected: 09/10/21 2003    Specimen: Urine, Catheter Updated: 09/10/21 2031     Color, UA Dark Yellow     Appearance, UA Cloudy     pH, UA <=5.0     Specific Gravity, UA 1.020     Glucose, UA Negative     Ketones, UA Trace     Bilirubin, UA Negative     Blood, UA Large (3+)     Protein, UA Trace     Leuk Esterase, UA Trace     Nitrite, UA Negative     Urobilinogen, UA 1.0 E.U./dL    Comprehensive Metabolic Panel [[id number]]  (Abnormal) Collected: 09/10/21 1742    Specimen: Blood Updated: 09/10/21 1818     Glucose 145 mg/dL      BUN 23 mg/dL      Creatinine 1.01 mg/dL      Sodium 137 mmol/L      Potassium 3.8 mmol/L      Chloride 101 mmol/L      CO2 22.9 mmol/L      Calcium 8.9 mg/dL      Total Protein 6.2 g/dL      Albumin 3.60 g/dL      ALT (SGPT) 17 U/L      AST (SGOT) 24 U/L      Alkaline Phosphatase 82 U/L      Total Bilirubin 0.7 mg/dL      eGFR Non African Amer 52 mL/min/1.73      Globulin 2.6 gm/dL      A/G Ratio 1.4 g/dL      BUN/Creatinine Ratio 22.8     Anion Gap 13.1 mmol/L     Narrative:      GFR Normal >60  Chronic Kidney Disease <60  Kidney Failure <15      Troponin [[id number]]  (Normal) Collected: 09/10/21 1742    Specimen: Blood Updated: 09/10/21 1817     Troponin T <0.010 ng/mL     Narrative:      Troponin T Reference Range:  <= 0.03 ng/mL-   Negative for AMI  >0.03 ng/mL-     Abnormal for myocardial necrosis.  Clinicians would have to utilize clinical acumen, EKG, Troponin and serial changes to determine if it is an Acute Myocardial Infarction or myocardial injury due to an underlying chronic condition.       Results may be falsely decreased if  patient taking Biotin.      BNP [[id number]]  (Abnormal) Collected: 09/10/21 1742    Specimen: Blood Updated: 09/10/21 1815     proBNP 1,908.0 pg/mL     Narrative:      Among patients with dyspnea, NT-proBNP is highly sensitive for the detection of acute congestive heart failure. In addition NT-proBNP of <300 pg/ml effectively rules out acute congestive heart failure with 99% negative predictive value.    Results may be falsely decreased if patient taking Biotin.      CBC & Differential [[id number]]  (Abnormal) Collected: 09/10/21 1742    Specimen: Blood Updated: 09/10/21 1754    Narrative:      The following orders were created for panel order CBC & Differential.  Procedure                               Abnormality         Status                     ---------                               -----------         ------                     CBC Auto Differential[[id number]]        Abnormal            Final result                 Please view results for these tests on the individual orders.    CBC Auto Differential [[id number]]  (Abnormal) Collected: 09/10/21 1742    Specimen: Blood Updated: 09/10/21 1754     WBC 11.12 10*3/mm3      RBC 4.20 10*6/mm3      Hemoglobin 11.8 g/dL      Hematocrit 36.8 %      MCV 87.6 fL      MCH 28.1 pg      MCHC 32.1 g/dL      RDW 15.5 %      RDW-SD 49.1 fl      MPV 10.1 fL      Platelets 238 10*3/mm3      Neutrophil % 69.7 %      Lymphocyte % 20.9 %      Monocyte % 7.8 %      Eosinophil % 0.7 %      Basophil % 0.3 %      Immature Grans % 0.6 %      Neutrophils, Absolute 7.75 10*3/mm3      Lymphocytes, Absolute 2.32 10*3/mm3      Monocytes, Absolute 0.87 10*3/mm3      Eosinophils, Absolute 0.08 10*3/mm3      Basophils, Absolute 0.03 10*3/mm3      Immature Grans, Absolute 0.07 10*3/mm3      nRBC 0.0 /100 WBC         Data Review:  Results from last 7 days   Lab Units 09/16/21  0711 09/15/21  0719 09/15/21  0719 09/14/21  1554 09/14/21  0928 09/14/21  0928   SODIUM mmol/L 139  --  139  --   --  140    POTASSIUM mmol/L 3.4*  --  4.0 4.1   < > 3.4*   CHLORIDE mmol/L 107  --  107  --   --  107   CO2 mmol/L 21.6*  --  20.3*  --   --  22.5   BUN mg/dL 6*  --  9  --   --  9   CREATININE mg/dL 0.70  --  0.67  --   --  0.61   GLUCOSE mg/dL 131*   < > 120*  --    < > 119*   CALCIUM mg/dL 8.2*  --  8.5*  --   --  8.3*    < > = values in this interval not displayed.     Results from last 7 days   Lab Units 09/16/21  0711 09/15/21  0719 09/14/21  0928   WBC 10*3/mm3 7.65 9.24 9.61   HEMOGLOBIN g/dL 9.3* 10.2* 9.5*   HEMATOCRIT % 29.8* 33.7* 29.6*   PLATELETS 10*3/mm3 254 258 234             Lab Results   Lab Value Date/Time    TROPONINT <0.010 09/10/2021 1742    TROPONINT <0.010 09/06/2021 1135    TROPONINT <0.010 01/14/2020 1850    TROPONINT <0.010 01/04/2020 2007    TROPONINT <0.010 10/21/2016 2331    TROPONINT <0.010 10/21/2016 2125    TROPONINT <0.01 08/25/2014 1135         Results from last 7 days   Lab Units 09/10/21  1742   ALK PHOS U/L 82   BILIRUBIN mg/dL 0.7   ALT (SGPT) U/L 17   AST (SGOT) U/L 24             No results found for: POCGLU        Past Medical History:   Diagnosis Date   • Atrial fibrillation (CMS/[facility])    • Hyperlipidemia    • Hypertension    • Lacunar infarct, acute (CMS/HCC) 01/2020    right hemisphere secondary to small vessel thrombosis   • New onset atrial fibrillation (CMS/HCC) 01/2020    now on rate control along with Eliquis   • Prolapsed uterus     per patient   • Stroke (CMS/[facility])    • TIA (transient ischemic attack) 01/2020   • Weakness        Assessment:  Active Hospital Problems    Diagnosis  POA   • **General weakness [R53.1]  Yes   • Rectal bleeding [K62.5]  Unknown   • Hematuria [R31.9]  Yes   • Dizziness and giddiness [R42]  Yes   • History of CVA (cerebrovascular accident) [Z86.73]  Not Applicable   • Anticoagulated [Z79.01]  Not Applicable   • Paroxysmal atrial fibrillation (CMS/HCC) [I48.0]  Yes   • Hyperlipidemia [E78.5]  Yes   • Hypertension [I10]  Yes   • Gastroesophageal  reflux disease with esophagitis [K21.00]  Yes      Resolved Hospital Problems   No resolved problems to display.       Plan:  Neurology consult noted. GI consult noted and follow lab.  ? Colonoscopy  Report noted  .anticoagulation on hold restart when OK with GI.    Howie Wilkins MD  9/16/2021  16:18 EDT

## 2025-03-17 NOTE — BRIEF OP NOTE
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  The components all appeared well-fixed .  Complications: None.  Implants:   Implant Name Type Inv. Item Serial No.  Lot No. LRB No. Used Action   Poly insert 10mm Total Joint Component/Insert   HORACIO  Right 1 Explanted   IMP COMP ARTICULAR SURF ZIM NEXGEN CR-FLEX GREEN 5-6H 14MM - [id number] Total Joint Component/Insert IMP COMP ARTICULAR SURF ZIM NEXGEN CR-FLEX GREEN 5-6H 14MM  [employer] [id number] Right 1 Implanted

## 2025-03-17 NOTE — ANESTHESIA PREPROCEDURE EVALUATION
Anesthesia Pre-Procedure Evaluation    Patient: Idris Woodson   MRN: [id number] : 1965        Procedure : Procedure(s):  RIGHT KNEE REVISION WITH EXPLORATION AND EXCHANGE OF POLYETHYLENE INSERT          Past Medical History:   Diagnosis Date    ADD (attention deficit disorder)     ADHD     Anxiety     Arthritis     Arthritis of wrist     Cervical radiculopathy     Cervicalgia     Closed fracture of navicular (scaphoid) bone of wrist     Congenital sensorineural hearing loss     Controlled diabetes mellitus (H)     Depressive disorder     Essential hypertension     Ganglion     Gout     Hearing loss     Hyperlipidemia LDL goal <100     Inguinal hernia     Lumbago     RABIA (obstructive sleep apnea)     Primary osteoarthritis of left hip     Restless leg syndrome     Spondylosis of cervical region without myelopathy or radiculopathy     Type 2 diabetes mellitus (H)     Ureteral stone       Past Surgical History:   Procedure Laterality Date    ARTHROPLASTY REVISION HIP ANTERIOR Left 2024    Procedure: REVISION LEFT HIP TOTAL ARTHROPLASTY WITH EXCHANGE OF FEMORAL HEAD AND NECK COMPONENTS;  Surgeon: Glen Rahman MD;  Location: [facility] OR    CYST REMOVAL Right     leg    FINGER FRACTURE SURGERY Right     Right thumb    HEMORRHOIDECTOMY      INGUINAL HERNIA REPAIR Bilateral     KNEE ARTHROPLASTY Right     KNEE ARTHROPLASTY Left     KNEE SURGERY Right     ORTHOPEDIC SURGERY Left 2024    L hip    SCAPHOID FRACTURE SURGERY      SHOULDER SURGERY Left     TYMPANOSTOMY TUBE PLACEMENT      in childhood    WRIST SURGERY Left      (3 times)      Allergies   Allergen Reactions    Tramadol Palpitations and Itching     Agitation      Social History     Tobacco Use    Smoking status: Never    Smokeless tobacco: Never   Substance Use Topics    Alcohol use: Not Currently     Comment: once or twice a week      Wt Readings from Last 1 Encounters:   25 104.8 kg (231 lb 1.6 oz)        Anesthesia  "Evaluation   Pt has had prior anesthetic.     No history of anesthetic complications       ROS/MED HX  ENT/Pulmonary:     (+) sleep apnea,                                    (-) tobacco use   Neurologic: Comment: RLS      Cardiovascular:     (+) Dyslipidemia hypertension- -   -  - -                                      METS/Exercise Tolerance:     Hematologic:       Musculoskeletal: Comment: Recurrent post op hip dislocations Left hip  (+)  arthritis,             GI/Hepatic:       Renal/Genitourinary:       Endo:     (+)  type II DM,                 (-) Type I DM   Psychiatric/Substance Use:     (+) psychiatric history depression       Infectious Disease:       Malignancy:       Other:      (+)  , H/O Chronic Pain,         Physical Exam    Airway        Mallampati: II   TM distance: > 3 FB   Neck ROM: full   Mouth opening: > 3 cm    Respiratory Devices and Support         Dental       (+) Minor Abnormalities - some fillings, tiny chips      Cardiovascular   cardiovascular exam normal          Pulmonary   pulmonary exam normal                OUTSIDE LABS:  CBC:   Lab Results   Component Value Date    WBC 4.4 05/13/2024    HGB 12.4 (L) 05/14/2024    HGB 12.7 (L) 05/13/2024    HCT 36.2 (L) 05/13/2024     05/13/2024     BMP:   Lab Results   Component Value Date    POTASSIUM 3.6 05/14/2024    POTASSIUM 4.0 05/13/2024    CR 0.64 (L) 05/13/2024     (H) 05/13/2024     COAGS: No results found for: \"PTT\", \"INR\", \"FIBR\"  POC: No results found for: \"BGM\", \"HCG\", \"HCGS\"  HEPATIC: No results found for: \"ALBUMIN\", \"PROTTOTAL\", \"ALT\", \"AST\", \"GGT\", \"ALKPHOS\", \"BILITOTAL\", \"BILIDIRECT\", \"SILVIA\"  OTHER: No results found for: \"PH\", \"LACT\", \"A1C\", \"AHSAN\", \"PHOS\", \"MAG\", \"LIPASE\", \"AMYLASE\", \"TSH\", \"T4\", \"T3\", \"CRP\", \"SED\"    Anesthesia Plan    ASA Status:  3       Anesthesia Type: General.     - Airway: ETT   Induction: Intravenous.   Maintenance: Balanced.   Techniques and Equipment:     - Airway: Video-Laryngoscope   "     Consents    Anesthesia Plan(s) and associated risks, benefits, and realistic alternatives discussed. Questions answered and patient/representative(s) expressed understanding.     - Discussed:     - Discussed with:  Patient            Postoperative Care    Pain management: IV analgesics, Oral pain medications, Peripheral nerve block (Single Shot).   PONV prophylaxis: Ondansetron (or other 5HT-3), Dexamethasone or Solumedrol     Comments:    Other Comments: The surgeon has given a verbal order transferring care of this patient to me for the performance of a regional analgesia block for post-op pain control. It is requested of me because I am uniquely trained and qualified to perform this block and the surgeon is neither trained nor qualified to perform this procedure.             Niesha Carrizales    I have reviewed the pertinent notes and labs in the chart from the past 30 days and (re)examined the patient.  Any updates or changes from those notes are reflected in this note.    Clinically Significant Risk Factors Present on Admission                   # Hypertension: Home medication list includes antihypertensive(s)           # Obesity: Estimated body mass index is 31.34 kg/m  as calculated from the following:    Height as of this encounter: 1.829 m (6').    Weight as of this encounter: 104.8 kg (231 lb 1.6 oz).

## 2025-03-17 NOTE — ANESTHESIA PROCEDURE NOTES
Adductor canal and Femoral Procedure Note    Pre-Procedure   Staff -        Anesthesiologist:  Niesha Carrizales       Performed By: anesthesiologist       Location: pre-op       Pre-Anesthestic Checklist: patient identified, IV checked, site marked, risks and benefits discussed, informed consent, monitors and equipment checked, pre-op evaluation, at physician/surgeon's request and post-op pain management  Timeout:       Correct Patient: Yes        Correct Procedure: Yes        Correct Site: Yes        Correct Position: Yes        Correct Laterality: Yes        Site Marked: Yes  Procedure Documentation  Procedure: Adductor canal, Femoral         Laterality: right       Patient Position: supine       Patient Prep/Sterile Barriers: sterile gloves, mask       Skin prep: Chloraprep       Local skin infiltrated with 2 mL of 1% lidocaine.        Needle Gauge: 20.        Needle Length (millimeters): 100        Ultrasound guided       1. Ultrasound was used to identify targeted nerve, plexus, vascular marker, or fascial plane and place a needle adjacent to it in real-time.       2. Ultrasound was used to visualize the spread of anesthetic in close proximity to the above referenced structure.       3. A permanent image is entered into the patient's record.       4. The visualized anatomic structures appeared normal.       5. There were no apparent abnormal pathologic findings.    Assessment/Narrative         The placement was negative for: blood aspirated, painful injection and site bleeding       Paresthesias: No.       Test dose of mL at.         Test dose negative, 3 minutes after injection, for signs of intravascular, subdural, or intrathecal injection.       Bolus given via needle..        Secured via.        Insertion/Infusion Method: Single Shot       Complications: none       Injection made incrementally with aspirations every 3 mL.    Medication(s) Administered   Ropivacaine 0.5% w/ 1:400K Epi (Injection) - Injection   15  "mL - 3/17/2025 12:22:00 PM   Comments:  Placed anterior to the artery near the femoral nerve branches in the adductor canal    Pt tolerated well.    No complications.      The surgeon has given a verbal order transferring care of this patient to me for the performance of a regional analgesia block for post-op pain control. It is requested of me because I am uniquely trained and qualified to perform this block and the surgeon is neither trained nor qualified to perform this procedure.        FOR [facility] ([facility] [place]) ONLY:   Pain Team Contact information: please page the Pain Team Via [employer]. Search \"Pain\". During daytime hours, please page the attending first. At night please page the resident first.      "

## 2025-03-18 ENCOUNTER — APPOINTMENT (OUTPATIENT)
Dept: PHYSICAL THERAPY | Facility: CLINIC | Age: 60
End: 2025-03-18
Attending: ORTHOPAEDIC SURGERY
Payer: COMMERCIAL

## 2025-03-18 VITALS
TEMPERATURE: 97.7 F | BODY MASS INDEX: 31.3 KG/M2 | OXYGEN SATURATION: 94 % | WEIGHT: 231.1 LBS | RESPIRATION RATE: 16 BRPM | DIASTOLIC BLOOD PRESSURE: 81 MMHG | HEIGHT: 72 IN | HEART RATE: 56 BPM | SYSTOLIC BLOOD PRESSURE: 149 MMHG

## 2025-03-18 LAB
FASTING STATUS PATIENT QL REPORTED: NO
GLUCOSE SERPL-MCNC: 153 MG/DL (ref 70–99)
HGB BLD-MCNC: 14.9 G/DL (ref 13.3–17.7)

## 2025-03-18 PROCEDURE — 85018 HEMOGLOBIN: CPT | Performed by: ORTHOPAEDIC SURGERY

## 2025-03-18 PROCEDURE — 250N000013 HC RX MED GY IP 250 OP 250 PS 637: Performed by: ORTHOPAEDIC SURGERY

## 2025-03-18 PROCEDURE — 36415 COLL VENOUS BLD VENIPUNCTURE: CPT | Performed by: ORTHOPAEDIC SURGERY

## 2025-03-18 PROCEDURE — 999N000111 HC STATISTIC OT IP EVAL DEFER: Performed by: OCCUPATIONAL THERAPIST

## 2025-03-18 PROCEDURE — 97116 GAIT TRAINING THERAPY: CPT | Mod: GP | Performed by: PHYSICAL THERAPIST

## 2025-03-18 PROCEDURE — 97530 THERAPEUTIC ACTIVITIES: CPT | Mod: GP | Performed by: PHYSICAL THERAPIST

## 2025-03-18 PROCEDURE — 99207 PR NO BILLABLE SERVICE THIS VISIT: CPT | Performed by: PHYSICIAN ASSISTANT

## 2025-03-18 PROCEDURE — 250N000013 HC RX MED GY IP 250 OP 250 PS 637: Performed by: HOSPITALIST

## 2025-03-18 PROCEDURE — 82947 ASSAY GLUCOSE BLOOD QUANT: CPT | Performed by: ORTHOPAEDIC SURGERY

## 2025-03-18 PROCEDURE — 250N000011 HC RX IP 250 OP 636: Performed by: ORTHOPAEDIC SURGERY

## 2025-03-18 RX ORDER — CELECOXIB 200 MG/1
200 CAPSULE ORAL DAILY PRN
Status: SHIPPED
Start: 2025-03-18

## 2025-03-18 RX ADMIN — LISINOPRIL 40 MG: 40 TABLET ORAL at 08:28

## 2025-03-18 RX ADMIN — OXYCODONE HYDROCHLORIDE 10 MG: 5 TABLET ORAL at 15:03

## 2025-03-18 RX ADMIN — CEFAZOLIN SODIUM 2 G: 2 SOLUTION INTRAVENOUS at 02:24

## 2025-03-18 RX ADMIN — SENNOSIDES AND DOCUSATE SODIUM 1 TABLET: 50; 8.6 TABLET ORAL at 08:28

## 2025-03-18 RX ADMIN — ACETAMINOPHEN 975 MG: 325 TABLET, FILM COATED ORAL at 00:11

## 2025-03-18 RX ADMIN — METOPROLOL SUCCINATE 25 MG: 25 TABLET, EXTENDED RELEASE ORAL at 08:28

## 2025-03-18 RX ADMIN — HYDROXYZINE HYDROCHLORIDE 25 MG: 25 TABLET, FILM COATED ORAL at 16:39

## 2025-03-18 RX ADMIN — OXYCODONE HYDROCHLORIDE 10 MG: 5 TABLET ORAL at 11:00

## 2025-03-18 RX ADMIN — OXYCODONE HYDROCHLORIDE 10 MG: 5 TABLET ORAL at 06:33

## 2025-03-18 RX ADMIN — AMLODIPINE BESYLATE 10 MG: 10 TABLET ORAL at 08:28

## 2025-03-18 RX ADMIN — ASPIRIN 325 MG: 325 TABLET, COATED ORAL at 08:28

## 2025-03-18 RX ADMIN — POLYETHYLENE GLYCOL 3350 17 G: 17 POWDER, FOR SOLUTION ORAL at 08:28

## 2025-03-18 RX ADMIN — PANTOPRAZOLE SODIUM 40 MG: 40 TABLET, DELAYED RELEASE ORAL at 06:33

## 2025-03-18 RX ADMIN — HYDROXYZINE HYDROCHLORIDE 25 MG: 25 TABLET, FILM COATED ORAL at 06:33

## 2025-03-18 RX ADMIN — ACETAMINOPHEN 975 MG: 325 TABLET, FILM COATED ORAL at 08:28

## 2025-03-18 RX ADMIN — ACETAMINOPHEN 975 MG: 325 TABLET, FILM COATED ORAL at 15:03

## 2025-03-18 ASSESSMENT — ACTIVITIES OF DAILY LIVING (ADL)
ADLS_ACUITY_SCORE: 39
ADLS_ACUITY_SCORE: 39
ADLS_ACUITY_SCORE: 38
ADLS_ACUITY_SCORE: 39
ADLS_ACUITY_SCORE: 38
ADLS_ACUITY_SCORE: 38
ADLS_ACUITY_SCORE: 39
ADLS_ACUITY_SCORE: 39
ADLS_ACUITY_SCORE: 38
ADLS_ACUITY_SCORE: 39
ADLS_ACUITY_SCORE: 38
ADLS_ACUITY_SCORE: 38
ADLS_ACUITY_SCORE: 39
ADLS_ACUITY_SCORE: 38

## 2025-03-18 NOTE — PROGRESS NOTES
03/17/25 1900   Appointment Info   Signing Clinician's Name / Credentials (PT) Aguilar Davidson DPT   Rehab Comments (PT) WBAT RLE, ROM as tolerated   Living Environment   People in Home spouse   Current Living Arrangements house   Home Accessibility stairs within home   Number of Stairs, Within Home, Primary greater than 10 stairs  (18)   Stair Railings, Within Home, Primary railings not in good condition, need repair for safe use   Transportation Anticipated family or friend will provide   Living Environment Comments spouse can assist at discharge   Self-Care   Usual Activity Tolerance good   Current Activity Tolerance moderate   Equipment Currently Used at Home none   Fall history within last six months no   Activity/Exercise/Self-Care Comment At baseline pt IND with mobility and ADL's   General Information   Onset of Illness/Injury or Date of Surgery 03/17/25   Referring Physician Glen Rahman MD   Patient/Family Therapy Goals Statement (PT) go home   Pertinent History of Current Problem (include personal factors and/or comorbidities that impact the POC) 59 y.o. male s/p R TKA revision on 3/17/2025, POD#0   Existing Precautions/Restrictions fall   Weight-Bearing Status - LLE full weight-bearing   Weight-Bearing Status - RLE weight-bearing as tolerated   Cognition   Affect/Mental Status (Cognition) WNL   Orientation Status (Cognition) oriented x 4   Follows Commands (Cognition) WNL   Pain Assessment   Patient Currently in Pain Yes, see Vital Sign flowsheet  (5/10 R knee)   Integumentary/Edema   Integumentary/Edema no deficits were identifed   Posture    Posture Not impaired   Range of Motion (ROM)   Range of Motion ROM deficits secondary to surgical procedure;ROM deficits secondary to pain;ROM deficits secondary to swelling;ROM deficits secondary to weakness   ROM Comment R knee AROM ~0-75 limited by pain   Strength (Manual Muscle Testing)   Strength (Manual Muscle Testing) Able to perform R SLR;Able to  perform L SLR;Deficits observed during functional mobility   Strength Comments not formally assessed, deficits with R knee but able to demo good quad set and demo SLR   Bed Mobility   Comment, (Bed Mobility) SBA   Transfers   Comment, (Transfers) CGA with FWW   Gait/Stairs (Locomotion)   [place] Level (Gait) contact guard   Assistive Device (Gait) walker, front-wheeled   Distance in Feet (Gait) 10'   Pattern (Gait) step-through   Deviations/Abnormal Patterns (Gait) antalgic   Balance   Balance Comments dynamic balance deficits after TKA   Sensory Examination   Sensory Perception patient reports no sensory changes   Clinical Impression   Criteria for Skilled Therapeutic Intervention Yes, treatment indicated   PT Diagnosis (PT) impaired gait   Influenced by the following impairments pain, deficits with ROM, strength, balance,   Functional limitations due to impairments decreased ind with functional mobility, falls risk   Clinical Presentation (PT Evaluation Complexity) stable   Clinical Presentation Rationale PMH and clinical judgement   Clinical Decision Making (Complexity) low complexity   Planned Therapy Interventions (PT) bed mobility training;balance training;cryotherapy;gait training;patient/family education;ROM (range of motion);stair training;strengthening;stretching;transfer training;progressive activity/exercise   Risk & Benefits of therapy have been explained evaluation/treatment results reviewed;care plan/treatment goals reviewed;risks/benefits reviewed;current/potential barriers reviewed;participants voiced agreement with care plan;participants included;patient   PT Total Evaluation Time   PT Eval, Low Complexity Minutes (32827) 9   Physical Therapy Goals   PT Frequency 2x/day   PT Predicted Duration/Target Date for Goal Attainment 03/18/25   PT Goals Bed Mobility;Transfers;Gait;Stairs   PT: Bed Mobility Modified independent;Supine to/from sit   PT: Transfers Modified independent;Sit to/from  stand;Assistive device   PT: Gait Modified independent;Rolling walker;Greater than 200 feet   PT: Stairs Minimal assist;Greater than 10 stairs  (pt reports railing on stairs at home not safe)   Interventions   Interventions Quick Adds Gait Training;Therapeutic Activity;Therapeutic Procedure   Therapeutic Procedure/Exercise   Ther. Procedure: strength, endurance, ROM, flexibillity Minutes (75600) 6   Symptoms Noted During/After Treatment fatigue;increased pain   Treatment Detail/Skilled Intervention Educated on post surgical benefits of TE on circulation, functional ROM, and strength. Left pt with TE handout. With pt in supine cued for technique to perform AP's x 20, RLE: quad sets x 5, heel slides x 5, hamstring set x 5, SAQ x 5, SLR x 5. Pt tolerated all TE well   Therapeutic Activity   Therapeutic Activities: dynamic activities to improve functional performance Minutes (37745) 9   Symptoms Noted During/After Treatment Fatigue;Increased pain   Treatment Detail/Skilled Intervention Greeted pt supine in bed. Eval completed. Educated on orders for WBAT, ROM to flexion tolerance, and importance of keeping knee straight when resting and demonstrated how to do so. Also educated on walking program once home and how to balance rest with activity. With HOB elevated supine<>sit SBA, use of bedrail. Pt able to reposition in bed IND. Sit<>stand with FWW CGA-SBA, cues for safe walker and hand placement and pt able to demo back well. Increased time for line management and room set up   Gait Training   Gait Training Minutes (22972) 9   Symptoms Noted During/After Treatment (Gait Training) fatigue;increased pain   Treatment Detail/Skilled Intervention With pt standing in front of bed with FWW and CGA, cued for pre-gait marching in place, pt able to clear both feet well and demo no knee buckling. Pt then amb ~200' with FWW and CGA progressing to SBA. Slow gait speed, mildly antalgic, appearing steady with no LoB noted. Using good  even step-through pattern, vcs for safe walker placement   Distance in Feet 200   PT Discharge Planning   PT Plan review/progress HEP, progress gait with FWW, trial steps   PT Discharge Recommendation (DC Rec)   (defer to ortho)   PT Rationale for DC Rec Pt below baseline but moving well and using safe technique, able to amb ~200' with FWW and SBA. Anticipate pt will progress and by discharge be at/near Mod-I bed mobility, Mod-I transfers with FWW, Mod-I amb with FWW, Nilesh for steps. Pt will have good 24/7 support at home from spouse and daughter will be home for a week on spring break and able to assist   PT Brief overview of current status SBA with FWW - Goals of therapy will be to address safe mobility and make recs for d/c to next level of care. Pt and RN will continue to follow all falls risk precautions as documented by RN staff while hospitalized   PT Total Distance Amb During Session (feet) 210   Physical Therapy Time and Intention   Timed Code Treatment Minutes 24   Total Session Time (sum of timed and untimed services) 33

## 2025-03-18 NOTE — PROGRESS NOTES
POD# 1    SUBJECTIVE  Pain reasonably well controlled on Oxycodone      OBJECTIVE:   BP (!) 153/88 (BP Location: Left arm)   Pulse 58   Temp 97.1  F (36.2  C) (Oral)   Resp 16   Ht 1.829 m (6')   Wt 104.8 kg (231 lb 1.6 oz)   SpO2 95%   BMI 31.34 kg/m     Hemoglobin   Date Value Ref Range Status   03/17/2025 14.8 13.3 - 17.7 g/dL Final   ]   Wound: dressing dry  Hgb pending      ASSESSMENT   stable    PLAN   Mobilize in PT  Home later today  Aspirin for DVT prophylaxis  Follow up 2 weeks postop    Glen Rahman MD

## 2025-03-18 NOTE — PLAN OF CARE
OT order received and chart reviewed.  Per discussion with patient, no skilled OT needs identified.  Patient has DME from previous surgery, family can assist at home, and per PT pt is SBA for transfers/mobility.  OT will defer and complete orders.

## 2025-03-18 NOTE — PROGRESS NOTES
Patient vital signs are at baseline: Yes  Patient able to ambulate as they were prior to admission or with assist devices provided by therapies during their stay:  Yes  Patient MUST void prior to discharge:  Yes  Patient able to tolerate oral intake:  Yes  Pain has adequate pain control using Oral analgesics:  Yes  Does patient have an identified :  Yes  Has goal D/C date and time been discussed with patient:  Yes     Pt A&Ox4. VSS on RA. Reg diet. Up 1A GB&W, voiding in BR/urinal. R knee dressing CDI, CMS intact. Pain controlled w/ scheduled Tylenol and PRN Oxycodone/Atarax. PIV removed, discharge meds, and belongings sent w/ patient. Discharge education provided to pt and family, verbalized understanding. All consulting providers signed off. Pt discharged home w/ family, Continue plan of Care.

## 2025-03-18 NOTE — PROGRESS NOTES
[facility]  Hospitalist Brief note:    Routine postop hospitalist consult placed 3/17.  S/p R RKA 3/17     I was assigned 3/18 Amm chart reviewed, had not yet been seen by [facility].    In chart review , pt is discharging today and no apparent new medical needs.      Discussed with Ortho provider Dr Rahman via phone, mainly a routine consult but periop BP was high, but better today. And no specific query//need for hospitalist consult today and canceled.    RN did want me to still reconcile all preop meds, verbally told ortho can resume home meds all now and no reason to delay d/c as I will not be seeing pt.    Later, I them went to d/c navigator and did reconcile all meds when no response to above request.     Should monitor postop BP at home and contact PCP if SBP > 150 persistently. Added to d.c instruction    No charge  for this note    [facility] House Officer/MINDY  Missael Tillman PA-C  [facility]  Securely message with the Vocera Web Console (learn more here)  Text page via [employer]reads Paging/Directory

## 2025-03-18 NOTE — PROGRESS NOTES
Physical Therapy Discharge Summary    Reason for therapy discharge:    Discharged to home with outpatient therapy.    Progress towards therapy goal(s). See goals on Care Plan in [facility] electronic health record for goal details.  Goals partially met.  Barriers to achieving goals:   discharge from facility.    Therapy recommendation(s):    Continued therapy is recommended.  Rationale/Recommendations:  OP PT per Ortho MD/PA protocol in order to improve knee ROM/strength and independence with functional mobility.

## 2025-03-19 NOTE — DISCHARGE SUMMARY
[facility] Discharge Summary    Idris Woodson MRN# [id number]   Age: 59 year old YOB: 1965     Date of Admission:  3/17/2025  Date of Discharge::  3/18/2025  5:01 PM  Admitting Physician:  Glen Rahman MD  Discharge Physician:  Glen Rahman MD     Home clinic: [facility]          Admission Diagnoses:   Mechanical failure right total knee arthroplasty          Discharge Diagnosis:     S/p revision right total knee arthroplasty for mechanical failure          Procedures:     Procedure(s): Revision right total knee arthroplasty with tibial polyethylene insert exchange       No procedures performed during this admission           Medications Prior to Admission:     Prior to Admission Medications   Prior to Admission Medications   Prescriptions Last Dose Informant Patient Reported? Taking?   acetaminophen (TYLENOL) 325 MG tablet  Self No No   Sig: Take 2 tablets (650 mg) by mouth every 4 hours as needed for other (mild pain)   amLODIPine (NORVASC) 10 MG tablet 3/17/2025 Morning Self Yes Yes   Sig: Take 10 mg by mouth daily.   amphetamine-dextroamphetamine (ADDERALL) 20 MG tablet Noon Self Yes Yes   Sig: Take 20 mg by mouth 3 times daily.   celecoxib (CELEBREX) 200 MG capsule Past Week Self Yes No   Sig: Take 200 mg by mouth 2 times daily.   citalopram (CELEXA) 40 MG tablet Morning Self Yes Yes   Sig: Take 40 mg by mouth daily   lisinopril (ZESTRIL) 40 MG tablet 3/17/2025 Morning Self Yes Yes   Sig: TAKE 1 TABLET BY MOUTH EVERY DAY   metoprolol succinate ER (TOPROL-XL) 25 MG 24 hr tablet 3/17/2025 Morning Self Yes Yes   Sig: TAKE 1 TABLET BY MOUTH EVERY DAY   omeprazole (PRILOSEC) 20 MG DR capsule Morning Self Yes Yes   Sig: Take 20 mg by mouth daily.      Facility-Administered Medications: None             Discharge Medications:     Discharge Medication List as of 3/18/2025  4:46 PM        START taking these medications    Details    aspirin (ASA) 325 MG EC tablet Take 1 tablet (325 mg) by mouth daily., Disp-30 tablet, R-0, E-Prescribe      hydrOXYzine HCl (ATARAX) 25 MG tablet Take 1 tablet (25 mg) by mouth every 6 hours as needed for itching or anxiety (with pain, moderate pain)., Disp-30 tablet, R-0, E-Prescribe      oxyCODONE (ROXICODONE) 5 MG tablet Take 1-2 tablets (5-10 mg) by mouth every 4 hours as needed for moderate to severe pain., Disp-30 tablet, R-0, E-Prescribe      senna-docusate (SENOKOT-S/PERICOLACE) 8.6-50 MG tablet Take 1-2 tablets by mouth 2 times daily. Take while on oral narcotics to prevent or treat constipation., Disp-30 tablet, R-0, E-PrescribeWhile taking narcotics           CONTINUE these medications which have CHANGED    Details   acetaminophen (TYLENOL) 325 MG tablet Take 2 tablets (650 mg) by mouth every 4 hours as needed for other (mild pain)., Disp-100 tablet, R-0, E-Prescribe      celecoxib (CELEBREX) 200 MG capsule Take 1 capsule (200 mg) by mouth daily as needed for moderate pain., No Print Out           CONTINUE these medications which have NOT CHANGED    Details   amLODIPine (NORVASC) 10 MG tablet Take 10 mg by mouth daily., Historical      amphetamine-dextroamphetamine (ADDERALL) 20 MG tablet Take 20 mg by mouth 3 times daily., Historical      citalopram (CELEXA) 40 MG tablet Take 40 mg by mouth daily, Historical      lisinopril (ZESTRIL) 40 MG tablet TAKE 1 TABLET BY MOUTH EVERY DAY, Historical      metoprolol succinate ER (TOPROL-XL) 25 MG 24 hr tablet TAKE 1 TABLET BY MOUTH EVERY DAY, Historical      omeprazole (PRILOSEC) 20 MG DR capsule Take 20 mg by mouth daily., Historical                   Consultations:   No consultations were done during this admission          Brief History of Illness:   The patient is a 59-year-old gentleman who is now over 10 years out from a right total knee arthroplasty done elsewhere. He has done well with his knee over the years, but more recently, he has developed some  swelling, pain, and instability. Recent x-rays revealed well-seated components, but there was significant polyethylene wear noted laterally. We talked about revision of his knee replacement, which would likely just involve replacement of his worn-out tibial polyethylene insert with a new insert, but we would be ready to do a full revision if we could not make his knee stable with a simple insert exchange.            Hospital Course:   The patient's hospital course was unremarkable.  He recovered as anticipated and experienced no post-operative complications. He was discharged home the next day with the plan for outpatient PT.          Discharge Instructions and Follow-Up:     Discharge diet: Regular   Discharge activity: Activity as tolerated   Discharge follow-up: Follow up with Dr. Rahman in 10-14 days   Wound care: Keep Aquacel dressing on.  Okay to shower.           Discharge Disposition:     Discharged to home      Attestation:  I have reviewed today's vital signs, notes, medications, labs and imaging.    Glen Rahman MD

## 2025-03-29 ENCOUNTER — HOSPITAL ENCOUNTER (EMERGENCY)
Facility: CLINIC | Age: 60
Discharge: LEFT BEFORE DISCHARGE | End: 2025-03-29
Admitting: PHYSICIAN ASSISTANT
Payer: COMMERCIAL

## 2025-03-29 VITALS
DIASTOLIC BLOOD PRESSURE: 91 MMHG | TEMPERATURE: 98.3 F | OXYGEN SATURATION: 99 % | RESPIRATION RATE: 14 BRPM | SYSTOLIC BLOOD PRESSURE: 160 MMHG | HEART RATE: 92 BPM

## 2025-03-29 PROCEDURE — 99281 EMR DPT VST MAYX REQ PHY/QHP: CPT

## 2025-03-29 ASSESSMENT — COLUMBIA-SUICIDE SEVERITY RATING SCALE - C-SSRS
2. HAVE YOU ACTUALLY HAD ANY THOUGHTS OF KILLING YOURSELF IN THE PAST MONTH?: NO
1. IN THE PAST MONTH, HAVE YOU WISHED YOU WERE DEAD OR WISHED YOU COULD GO TO SLEEP AND NOT WAKE UP?: NO
6. HAVE YOU EVER DONE ANYTHING, STARTED TO DO ANYTHING, OR PREPARED TO DO ANYTHING TO END YOUR LIFE?: NO

## 2025-03-29 NOTE — ED TRIAGE NOTES
Had a right total knee arthroplasty on 3/17. For the past 3 days developed increased pain, redness around the dressing, subjective fevers and chills.      Triage Assessment (Adult)       Row Name 03/29/25 0010          Triage Assessment    Airway WDL WDL        Respiratory WDL    Respiratory WDL WDL        Skin Circulation/Temperature WDL    Skin Circulation/Temperature WDL X  wound to right knee, dressing seems intact        Cardiac WDL    Cardiac WDL WDL        Peripheral/Neurovascular WDL    Peripheral Neurovascular WDL WDL        Cognitive/Neuro/Behavioral WDL    Cognitive/Neuro/Behavioral WDL X;mood/behavior     Mood/Behavior anxious        [place] Coma Scale    Best Eye Response 4-->(E4) spontaneous     Best Motor Response 6-->(M6) obeys commands     Best Verbal Response 5-->(V5) oriented     [place] Coma Scale Score 15

## 2025-03-29 NOTE — ED NOTES
"Refused blood test, would like to see a doctor immediately. When he learned there is a wait time, pt got up and left, stated \"forget about it, I will go to [facility] tomorrow\".  "

## (undated) DEVICE — GLOVE SENSICARE PI MICRO PF 6.5 LATEX FREE MSG9665

## (undated) DEVICE — SOL WATER IRRIG 1000ML BOTTLE 2F7114

## (undated) DEVICE — DRAPE SHEET REV FOLD 3/4 9349

## (undated) DEVICE — SUCTION IRR SYSTEM W/O TIP INTERPULSE HANDPIECE 0210-100-000

## (undated) DEVICE — DRSG ADAPTIC 3X8" 6113

## (undated) DEVICE — DRSG AQUACEL AG HYDROFIBER  3.5X10" 422605

## (undated) DEVICE — Device

## (undated) DEVICE — SOL NACL 0.9% IRRIG 1000ML BOTTLE 2F7124

## (undated) DEVICE — PACK TOTAL KNEE SOP15TKFSD

## (undated) DEVICE — BAG DECANTER STERILE WHITE DYNJDEC09

## (undated) DEVICE — GOWN IMPERVIOUS SPECIALTY XLG/XLONG 32474

## (undated) DEVICE — SPECIMEN CULTURETTE DBL SWAB 220109

## (undated) DEVICE — BONE CEMENT MIXEVAC III HI VAC KIT  0206-015-000

## (undated) DEVICE — GLOVE BIOGEL PI MICRO INDICATOR UNDERGLOVE SZ 8.0 48980

## (undated) DEVICE — LINEN TOWEL PACK X5 5464

## (undated) DEVICE — MANIFOLD NEPTUNE 4 PORT 700-20

## (undated) DEVICE — SUTURE STRATAFIX SPIRAL MONOCRYL PLUS 3-0 PS-1 SP SXMP1B101

## (undated) DEVICE — NDL SPINAL 18GA 3.5" 405184

## (undated) DEVICE — GLOVE SENSICARE PI ORTHO PF 7.0 LATEX FREE MSG9470

## (undated) DEVICE — STPL SKIN 35W 6.9MM  PXW35

## (undated) DEVICE — DRAPE CONVERTORS U-DRAPE 60X72" 8476

## (undated) DEVICE — DRAPE STERI TOWEL LG 1010

## (undated) DEVICE — IMM KNEE 24" 0814-2664

## (undated) DEVICE — SOLUTION WOUND CLEANSING 3/4OZ 10% PVP EA-L3011FB-50

## (undated) DEVICE — DRAPE IOBAN INCISE 23X17" 6650EZ

## (undated) DEVICE — PACK TOTAL HIP W/U DRAPE SOP15HUFSC

## (undated) DEVICE — BLADE SAW SAGITTAL STRK 18X90X1.27MM HD SYS 6 6118-127-090

## (undated) DEVICE — GLOVE BIOGEL PI MICRO INDICATOR UNDERGLOVE SZ 7.5 48975

## (undated) DEVICE — SUCTION MANIFOLD NEPTUNE 2 SYS 4 PORT 0702-020-000

## (undated) DEVICE — SYR 50ML LL W/O NDL 309653

## (undated) DEVICE — ESU GROUND PAD UNIVERSAL W/O CORD

## (undated) DEVICE — CAST PADDING 6" STERILE 9046S

## (undated) DEVICE — SU VICRYL 1 CTX 36" J371H

## (undated) DEVICE — DRSG STERI STRIP 1/2X4" R1547

## (undated) DEVICE — GLOVE BIOGEL PI SZ 7.5 40875

## (undated) DEVICE — BONE CLEANING TIP INTERPULSE  0210-010-000

## (undated) DEVICE — SU VICRYL 2-0 CT-1 27" UND J259H

## (undated) DEVICE — SU VICRYL 0 CT-1 CR 8X18" J740D

## (undated) DEVICE — SU ETHIBOND 1 CT-1 30" X425H

## (undated) DEVICE — GLOVE BIOGEL PI ORTHOPRO SZ 7.5 47675

## (undated) DEVICE — SOL NACL 0.9% INJ 1000ML BAG 2B1324X

## (undated) DEVICE — BLADE SAW SAGITTAL STRK MED WIDE 25X73X0.89MM 2108-105-000

## (undated) DEVICE — DRAPE C-ARM 60X42" 1013

## (undated) DEVICE — WRAP EZY KNEE 1213PP

## (undated) DEVICE — CAST PADDING 6" UNSTERILE 9046

## (undated) DEVICE — BLADE SAW SAGITTAL STRK 25X79.5X1.24MM 4/2000 2108-318-000

## (undated) RX ORDER — FENTANYL CITRATE 0.05 MG/ML
INJECTION, SOLUTION INTRAMUSCULAR; INTRAVENOUS
Status: DISPENSED
Start: 2024-05-13

## (undated) RX ORDER — FENTANYL CITRATE 50 UG/ML
INJECTION, SOLUTION INTRAMUSCULAR; INTRAVENOUS
Status: DISPENSED
Start: 2025-03-17

## (undated) RX ORDER — HYDROMORPHONE HYDROCHLORIDE 1 MG/ML
INJECTION, SOLUTION INTRAMUSCULAR; INTRAVENOUS; SUBCUTANEOUS
Status: DISPENSED
Start: 2025-03-17

## (undated) RX ORDER — PROPOFOL 10 MG/ML
INJECTION, EMULSION INTRAVENOUS
Status: DISPENSED
Start: 2025-03-17

## (undated) RX ORDER — FENTANYL CITRATE 0.05 MG/ML
INJECTION, SOLUTION INTRAMUSCULAR; INTRAVENOUS
Status: DISPENSED
Start: 2025-03-17

## (undated) RX ORDER — DEXAMETHASONE SODIUM PHOSPHATE 4 MG/ML
INJECTION, SOLUTION INTRA-ARTICULAR; INTRALESIONAL; INTRAMUSCULAR; INTRAVENOUS; SOFT TISSUE
Status: DISPENSED
Start: 2024-05-13

## (undated) RX ORDER — HYDROMORPHONE HCL IN WATER/PF 6 MG/30 ML
PATIENT CONTROLLED ANALGESIA SYRINGE INTRAVENOUS
Status: DISPENSED
Start: 2024-05-13

## (undated) RX ORDER — TRANEXAMIC ACID 650 MG/1
TABLET ORAL
Status: DISPENSED
Start: 2024-05-13

## (undated) RX ORDER — HYDROMORPHONE HYDROCHLORIDE 1 MG/ML
INJECTION, SOLUTION INTRAMUSCULAR; INTRAVENOUS; SUBCUTANEOUS
Status: DISPENSED
Start: 2024-05-13

## (undated) RX ORDER — ONDANSETRON 2 MG/ML
INJECTION INTRAMUSCULAR; INTRAVENOUS
Status: DISPENSED
Start: 2024-05-13

## (undated) RX ORDER — TRANEXAMIC ACID 650 MG/1
TABLET ORAL
Status: DISPENSED
Start: 2025-03-17

## (undated) RX ORDER — EPHEDRINE SULFATE 50 MG/ML
INJECTION, SOLUTION INTRAMUSCULAR; INTRAVENOUS; SUBCUTANEOUS
Status: DISPENSED
Start: 2024-05-13

## (undated) RX ORDER — ONDANSETRON 2 MG/ML
INJECTION INTRAMUSCULAR; INTRAVENOUS
Status: DISPENSED
Start: 2025-03-17

## (undated) RX ORDER — DEXAMETHASONE SODIUM PHOSPHATE 4 MG/ML
INJECTION, SOLUTION INTRA-ARTICULAR; INTRALESIONAL; INTRAMUSCULAR; INTRAVENOUS; SOFT TISSUE
Status: DISPENSED
Start: 2025-03-17

## (undated) RX ORDER — VANCOMYCIN HYDROCHLORIDE 1 G/20ML
INJECTION, POWDER, LYOPHILIZED, FOR SOLUTION INTRAVENOUS
Status: DISPENSED
Start: 2024-05-13

## (undated) RX ORDER — FENTANYL CITRATE 50 UG/ML
INJECTION, SOLUTION INTRAMUSCULAR; INTRAVENOUS
Status: DISPENSED
Start: 2024-05-13